# Patient Record
Sex: MALE | Race: WHITE | NOT HISPANIC OR LATINO | Employment: UNEMPLOYED | URBAN - METROPOLITAN AREA
[De-identification: names, ages, dates, MRNs, and addresses within clinical notes are randomized per-mention and may not be internally consistent; named-entity substitution may affect disease eponyms.]

---

## 2021-02-11 ENCOUNTER — HISTORICAL (OUTPATIENT)
Dept: RADIOLOGY | Facility: HOSPITAL | Age: 46
End: 2021-02-11

## 2021-02-24 ENCOUNTER — HISTORICAL (OUTPATIENT)
Dept: SURGERY | Facility: HOSPITAL | Age: 46
End: 2021-02-24

## 2021-03-24 ENCOUNTER — HISTORICAL (OUTPATIENT)
Dept: SURGERY | Facility: HOSPITAL | Age: 46
End: 2021-03-24

## 2021-04-14 ENCOUNTER — HISTORICAL (OUTPATIENT)
Dept: SURGERY | Facility: HOSPITAL | Age: 46
End: 2021-04-14

## 2021-10-04 ENCOUNTER — HISTORICAL (OUTPATIENT)
Dept: SURGERY | Facility: HOSPITAL | Age: 46
End: 2021-10-04

## 2022-02-15 ENCOUNTER — HISTORICAL (OUTPATIENT)
Dept: LAB | Facility: HOSPITAL | Age: 47
End: 2022-02-15

## 2022-02-15 LAB — SARS-COV-2 AG RESP QL IA.RAPID: NOT DETECTED

## 2022-02-16 ENCOUNTER — HISTORICAL (OUTPATIENT)
Dept: SURGERY | Facility: HOSPITAL | Age: 47
End: 2022-02-16

## 2022-04-11 ENCOUNTER — HISTORICAL (OUTPATIENT)
Dept: ADMINISTRATIVE | Facility: HOSPITAL | Age: 47
End: 2022-04-11
Payer: OTHER GOVERNMENT

## 2022-04-27 VITALS
SYSTOLIC BLOOD PRESSURE: 139 MMHG | DIASTOLIC BLOOD PRESSURE: 95 MMHG | WEIGHT: 210 LBS | BODY MASS INDEX: 28.44 KG/M2 | HEIGHT: 72 IN

## 2022-05-04 NOTE — HISTORICAL [facility]
This is a historical note converted from Stephanie. Formatting and pictures may have been removed.  Please reference Stephanie for original formatting and attached multimedia. Procedure Name  Left L3 and L4 Transforaminal Epidural Steroid Injections  ?   Pre-Procedure Diagnoses:  1. Chronic pain syndrome  2. Low back pain  3. Lumbar radiculopathy  4. Lumbar disc displacement  5. Lumbar degenerative disc disease  ?   Post-Procedure Diagnoses:  1. Chronic pain syndrome  2. Low back pain  3. Lumbar radiculopathy  4. Lumbar disc displacement  5. Lumbar degenerative disc disease  ?   Anesthesia:  Local and MAC  ?  Estimated Blood Loss:  None  ?  Complications:  None  ?  Informed Consent:  The procedure, risks, benefits, and alternatives were discussed with the patient.? There were no contraindications to the procedure.? The patient expressed understanding and agreed to proceed.? Fully informed written consent was obtained.?  ?  Description of the Procedure:  The patient was taken to the operating room.? IV access was obtained prior to the start of the procedure.? The patient was positioned prone on the fluoroscopy table.? Continuous hemodynamic monitoring was initiated and continued throughout the duration of the procedure.? The skin overlying the lumbosacral spine was prepped with Chloraprep and draped into a sterile field.? An oblique fluoroscopic view was obtained on the?left side at?L4, with the superior articular process of the inferior vertebral body aligned with the pedicle.? Skin anesthesia was achieved using 1 mL of 1% lidocaine.? A 22-gauge?3.5 inch Quinke spinal needle was slowly inserted and advanced towards the 6 oclock position of the pedicle and into the epidural space.? Proper needle position was confirmed under AP, oblique, and lateral fluoroscopic views.? Negative aspiration for blood or CSF was confirmed.? 0.5 mL of Isovue contrast was injected.? Fluoroscopic imaging revealed a clear outline of the spinal  nerve with proximal spread of agent through the neural foramen and into the epidural space.? Then a combination of 5 mg of dexamethasone and 1 mL of 0.5% bupivacaine was easily injected.? There was no pain on injection.? The needle was removed intact and bleeding was nil.? The same procedure was repeated in identical fashion on the?left side at L3. Sterile bandages were applied.? The patient was taken to the recovery room for further observation in stable condition.? The patient was then discharged home without any complications.

## 2022-05-04 NOTE — HISTORICAL [facility]
This is a historical note converted from Stephanie. Formatting and pictures may have been removed.  Please reference Stephanie for original formatting and attached multimedia. Procedure Name  1. Left Sacroiliac Joint Injection  2. Left Piriformis Injection  ?   Pre-Procedure Diagnoses:  1. Chronic pain syndrome  2. Left SI joint pain  3. Left sacroiliitis  4. Low back pain  5. Left piriformis syndrome  ?   Post-Procedure Diagnoses:  1. Chronic pain syndrome  2. Left SI joint pain  3. Left sacroiliitis  4. Low back pain  5. Left piriformis syndrome  ?   Anesthesia:  Local and MAC  ?  Estimated Blood Loss:  None  ?  Complications:  None  ?  Informed Consent:  The procedure, risks, benefits, and alternatives were discussed with the patient.? There were no contraindications to the procedure.? The patient expressed understanding and agreed to proceed.? Fully informed written consent was obtained.?  ?  Description of the Procedure:  The patient was taken to the operating room.? IV access was obtained prior to the start of the procedure.? The patient was positioned prone on the fluoroscopy table.? Continuous hemodynamic monitoring was initiated and continued throughout the duration of the procedure.? The skin overlying the lumbosacral spine was prepped with Chloraprep and draped into a sterile field.? Fluoroscopy was used to identify the location of the left SI joint.? Skin anesthesia was achieved using?1?mL of 1% lidocaine over the injection site.? A 22 gauge 3.5 inch Quinke spinal needle was slowly inserted and advanced?under intermittent fluoroscopy?through the SI joint capsule.? Proper needle position was confirmed under AP, oblique, and lateral fluoroscopic views.? Negative aspiration was confirmed.? Then a combination of?20 mg of Kenalog and?1 mL of?0.25% bupivacaine?was easily injected.? There was no pain on injection.? The needle was removed intact and bleeding was nil.?  Attention was then turned to the left  piriformis.??Fluoroscopy was used to identify the location of the left SI joint.? Skin anesthesia was achieved using?1?mL of 1% lidocaine over the injection site.? A 22 gauge 3.5 inch Quinke spinal needle was slowly inserted and advanced?under intermittent fluoroscopy until it made bony contact at the inferior aspect of the SI joint.? It was then walked 1 cm lateral and 1 cm inferior into the piriformis muscle.? Proper needle position was confirmed under AP, oblique, and lateral fluoroscopic views.? Negative aspiration was confirmed.? 1 mL of Isovue contrast was injected which revealed diagonal spread. Then a combination of?20 mg of Kenalog and?3 mL of?0.25% bupivacaine?was easily injected.? There was no pain on injection.? The needle was removed intact and bleeding was nil.? Sterile bandages were applied.? The patient was taken to the recovery room for further observation in stable condition.? The patient was then discharged home without any complications.

## 2022-05-04 NOTE — HISTORICAL [facility]
This is a historical note converted from Stephanie. Formatting and pictures may have been removed.  Please reference Stephanie for original formatting and attached multimedia. Procedure Name  Left Piriformis Injection  ?   Pre-Procedure Diagnoses:  1. Chronic pain syndrome  2. Left piriformis syndrome  3.?Left buttock pain  4. Low back pain  ?   1. Chronic pain syndrome  2. Left piriformis syndrome  3. Left buttock pain  4. Low back pain  ?   Anesthesia:  Local and MAC  ?  Estimated Blood Loss:  None  ?  Complications:  None  ?  Informed Consent:  The procedure, risks, benefits, and alternatives were discussed with the patient.? There were no contraindications to the procedure.? The patient expressed understanding and agreed to proceed.? Fully informed written consent was obtained.?  ?  Description of the Procedure:  The patient was taken to the operating room.? IV access was obtained prior to the start of the procedure.? The patient was positioned prone on the fluoroscopy table.? Continuous hemodynamic monitoring was initiated and continued throughout the duration of the procedure.? The skin overlying the lumbosacral spine was prepped with Choraprep and draped into a sterile field.? Fluoroscopy was used to identify the location of the left SI joint.? Skin anesthesia was achieved using?1?mL of 1% lidocaine over the injection site.? A 22 gauge 3.5 inch Quinke spinal needle was slowly inserted and advanced?under intermittent fluoroscopy until it made bony contact at the inferior aspect of the SI joint.? It was then walked 1 cm lateral and 1 cm inferior into the piriformis muscle.? Proper needle position was confirmed under AP, oblique, and lateral fluoroscopic views.? Negative aspiration was confirmed.? 1 mL of Isovue contrast was injected which revealed diagonal spread. Then a combination of?40 mg of Kenalog and?2 mL of?0.5% bupivacaine?was easily injected.? There was no pain on injection.? The needle was removed intact  and bleeding was nil.? The same procedure was repeated in identical fashion on the right side.? Sterile bandages were applied.? The patient was taken to the recovery room for further observation in stable condition.? The patient was then discharged home without any complications.

## 2022-05-04 NOTE — HISTORICAL [facility]
This is a historical note converted from Stephanie. Formatting and pictures may have been removed.  Please reference Stephanie for original formatting and attached multimedia. Procedure Name  Bilateral Sacroiliac Joint Injections  ?   Pre-Procedure Diagnoses:  1. Chronic pain syndrome  2. Bilateral SI joint pain  3. Bilateral sacroiliitis  4. Low back pain  ?   Post-Procedure Diagnoses:  1. Chronic pain syndrome  2. Bilateral SI joint pain  3. Bilateral sacroiliitis  4. Low back pain  ?   Anesthesia:  Local and MAC  ?  Estimated Blood Loss:  None  ?  Complications:  None  ?  Informed Consent:  The procedure, risks, benefits, and alternatives were discussed with the patient.? There were no contraindications to the procedure.? The patient expressed understanding and agreed to proceed.? Fully informed written consent was obtained.?  ?  Description of the Procedure:  The patient was taken to the operating room.? IV access was obtained prior to the start of the procedure.? The patient was positioned prone on the fluoroscopy table.? Continuous hemodynamic monitoring was initiated and continued throughout the duration of the procedure.? The skin overlying the lumbosacral spine was prepped with Chloraprep and draped into a sterile field.? Fluoroscopy was used to identify the location of the left SI joint.? Skin anesthesia was achieved using?1?mL of 1% lidocaine over the injection site.? A 22 gauge 3.5 inch Quinke spinal needle was slowly inserted and advanced?under intermittent fluoroscopy?through the SI joint capsule.? Proper needle position was confirmed under AP, oblique, and lateral fluoroscopic views.? Negative aspiration was confirmed.? Then a combination of?20 mg of Kenalog and?1 mL of?0.5% bupivacaine?was easily injected.? There was no pain on injection.? The needle was removed intact and bleeding was nil.? The same procedure was repeated in identical fashion on the right side.? Sterile bandages were applied.? The patient  was taken to the recovery room for further observation in stable condition.? The patient was then discharged home without any complications.

## 2022-05-21 NOTE — HISTORICAL [facility]
This is a historical note converted from Stephanie. Formatting and pictures may have been removed.  Please reference Stephanie for original formatting and attached multimedia. Procedure Name  1. Left Sacroiliac Joint Injection  2. Left Piriformis Injection  ?   Pre-Procedure Diagnoses:  1. Chronic pain syndrome  2. Left SI joint pain  3. Left sacroiliitis  4. Left piriformis syndrome  5. Left buttock pain  ?   Post-Procedure Diagnoses:  1. Chronic pain syndrome  2. Left SI joint pain  3. Left sacroiliitis  4. Left piriformis syndrome  5. Left buttock pain  ?   Anesthesia:  Local and MAC  ?  Estimated Blood Loss:  None  ?  Complications:  None  ?  Informed Consent:  The procedure, risks, benefits, and alternatives were discussed with the patient.? There were no contraindications to the procedure.? The patient expressed understanding and agreed to proceed.? Fully informed written consent was obtained.?  ?  Description of the Procedure:  The patient was taken to the operating room.? IV access was obtained prior to the start of the procedure.? The patient was positioned prone on the fluoroscopy table.? Continuous hemodynamic monitoring was initiated and continued throughout the duration of the procedure.? The skin overlying the lumbosacral spine was prepped with Chloraprep and draped into a sterile field.? Fluoroscopy was used to identify the location of the left SI joint.? Skin anesthesia was achieved using?1?mL of 1% lidocaine over the injection site.? A 22 gauge 3.5 inch Quinke spinal needle was slowly inserted and advanced?under intermittent fluoroscopy?through the SI joint capsule.? Proper needle position was confirmed under AP, oblique, and lateral fluoroscopic views.? Negative aspiration was confirmed.? Then a combination of?20 mg of Kenalog and?1 mL of?0.5% bupivacaine?was easily injected.? There was no pain on injection.? The needle was removed intact and bleeding was nil.?  Attention was then turned to the left  piriformis.? Fluoroscopy was used to identify the location of the left SI joint.? Skin anesthesia was achieved using?1?mL of 1% lidocaine over the injection site.? A 22 gauge 3.5 inch Quinke spinal needle was slowly inserted and advanced?under intermittent fluoroscopy until it made bony contact at the inferior aspect of the SI joint.? It was then walked 1 cm lateral and 1 cm inferior into the piriformis muscle.? Proper needle position was confirmed under AP, oblique, and lateral fluoroscopic views.? Negative aspiration was confirmed.? 1 mL of Isovue contrast was injected which revealed diagonal spread. Then a combination of?20 mg of Kenalog and?3 mL of?0.25% bupivacaine?was easily injected.? There was no pain on injection.? The needle was removed intact and bleeding was nil.? Sterile bandages were applied.? The patient was taken to the recovery room for further observation in stable condition.? The patient was then discharged home without any complications.

## 2022-07-26 ENCOUNTER — OFFICE VISIT (OUTPATIENT)
Dept: ORTHOPEDICS | Facility: CLINIC | Age: 47
End: 2022-07-26
Payer: OTHER GOVERNMENT

## 2022-07-26 VITALS
DIASTOLIC BLOOD PRESSURE: 104 MMHG | HEART RATE: 93 BPM | SYSTOLIC BLOOD PRESSURE: 147 MMHG | BODY MASS INDEX: 29.26 KG/M2 | WEIGHT: 216.06 LBS | HEIGHT: 72 IN

## 2022-07-26 DIAGNOSIS — G89.29 CHRONIC BACK PAIN GREATER THAN 3 MONTHS DURATION: Primary | ICD-10-CM

## 2022-07-26 DIAGNOSIS — M46.1 SACROILIITIS: ICD-10-CM

## 2022-07-26 DIAGNOSIS — M54.9 CHRONIC BACK PAIN GREATER THAN 3 MONTHS DURATION: Primary | ICD-10-CM

## 2022-07-26 DIAGNOSIS — M51.36 LUMBAR DEGENERATIVE DISC DISEASE: ICD-10-CM

## 2022-07-26 PROCEDURE — 99213 OFFICE O/P EST LOW 20 MIN: CPT | Mod: 25,,, | Performed by: ANESTHESIOLOGY

## 2022-07-26 PROCEDURE — 96372 PR INJECTION,THERAP/PROPH/DIAG2ST, IM OR SUBCUT: ICD-10-PCS | Mod: ,,, | Performed by: ANESTHESIOLOGY

## 2022-07-26 PROCEDURE — 96372 THER/PROPH/DIAG INJ SC/IM: CPT | Mod: ,,, | Performed by: ANESTHESIOLOGY

## 2022-07-26 PROCEDURE — 99213 PR OFFICE/OUTPT VISIT, EST, LEVL III, 20-29 MIN: ICD-10-PCS | Mod: 25,,, | Performed by: ANESTHESIOLOGY

## 2022-07-26 RX ORDER — RAMELTEON 8 MG/1
8 TABLET ORAL NIGHTLY
COMMUNITY

## 2022-07-26 RX ORDER — MELOXICAM 7.5 MG/1
7.5 TABLET ORAL DAILY
COMMUNITY

## 2022-07-26 RX ORDER — PRAVASTATIN SODIUM 20 MG/1
20 TABLET ORAL NIGHTLY
COMMUNITY

## 2022-07-26 RX ORDER — VENLAFAXINE 75 MG/1
75 TABLET ORAL DAILY
COMMUNITY

## 2022-07-26 RX ORDER — BETAMETHASONE SODIUM PHOSPHATE AND BETAMETHASONE ACETATE 3; 3 MG/ML; MG/ML
12 INJECTION, SUSPENSION INTRA-ARTICULAR; INTRALESIONAL; INTRAMUSCULAR; SOFT TISSUE
Status: COMPLETED | OUTPATIENT
Start: 2022-07-26 | End: 2022-07-26

## 2022-07-26 RX ORDER — MIRTAZAPINE 45 MG/1
45 TABLET, FILM COATED ORAL NIGHTLY
COMMUNITY

## 2022-07-26 RX ORDER — LIDOCAINE 50 MG/G
1 PATCH TOPICAL
COMMUNITY

## 2022-07-26 RX ORDER — PRAZOSIN HYDROCHLORIDE 2 MG/1
4 CAPSULE ORAL NIGHTLY
COMMUNITY

## 2022-07-26 RX ORDER — SENNOSIDES 8.6 MG/1
17.2 TABLET ORAL DAILY
COMMUNITY

## 2022-07-26 RX ORDER — TESTOSTERONE ENANTHATE 200 MG/ML
200 VIAL (ML) INTRAMUSCULAR
COMMUNITY

## 2022-07-26 RX ORDER — ADALIMUMAB 40MG/0.4ML
40 KIT SUBCUTANEOUS
COMMUNITY

## 2022-07-26 RX ADMIN — BETAMETHASONE SODIUM PHOSPHATE AND BETAMETHASONE ACETATE 12 MG: 3; 3 INJECTION, SUSPENSION INTRA-ARTICULAR; INTRALESIONAL; INTRAMUSCULAR; SOFT TISSUE at 03:07

## 2022-07-26 NOTE — PROGRESS NOTES
Beth Brooks MD        PATIENT NAME: Marlon Franco  : 1975  DATE: 22  MRN: [id number]      Billing Provider: Beth Brooks MD  Level of Service:   Patient PCP Information     Provider PCP Type    Primary Doctor No General          Reason for Visit / Chief Complaint: Follow-up (4 month f/u back pain, last seen 22; Left SI Joint & Left Piriformis Inj, 22. Pt states he did not do physical therapy. Pt states he had a fall about a month ago while fishing, broke two bones in back, has disc no report. Pain level 5 out of 10. )       Update PCP  Update Chief Complaint         History of Present Illness / Problem Focused Workflow     Marlon Franco presents to the clinic with Follow-up (4 month f/u back pain, last seen 22; Left SI Joint & Left Piriformis Inj, 22. Pt states he did not do physical therapy. Pt states he had a fall about a month ago while fishing, broke two bones in back, has disc no report. Pain level 5 out of 10. )     This is a 47-year-old male who returns to clinic today for follow-up of his chronic low back pain.  He was last seen here for follow-up back in March and had undergone left sacroiliac joint injection and left piriformis injection in February of this year.  He was doing pretty well after those injections until about a month ago when he fell while fishing in a boat.  He states that he has 2 fractures in his back.  He is scheduled to follow-up with a neurologist next week he currently rates his pain as 5/10 on the NRS.    Follow-up        Review of Systems     Review of Systems   Musculoskeletal: Positive for back pain.   All other systems reviewed and are negative.       Medical / Social / Family History     Past Medical History:   Diagnosis Date    Carpal tunnel syndrome of left wrist     Carpal tunnel syndrome of right wrist     Essential tremor     GERD (gastroesophageal reflux disease)     HTN (hypertension)     Low back pain      Lumbar radiculitis     Lumbar spinal stenosis     Piriformis syndrome     PTSD (post-traumatic stress disorder)     Sacroiliitis     Sleep apnea, unspecified        Past Surgical History:   Procedure Laterality Date    ANKLE SURGERY Left     BUNIONECTOMY Left     INJECTION OF PIRIFORMIS MUSCLE Left 02/16/2022    INJECTION, SACROILIAC JOINT Left 02/16/2022    ORIF LEFT ARM      REPAIR OF SCROTUM      SURGICAL REMOVAL OF PILONIDAL CYST      TRANSFORAMINAL EPIDURAL INJECTION OF STEROID Left 02/24/2021    L3 & L4    TRIGGER POINT INJECTION  10/04/2021       Social History  Mr. Franco  reports that he has never smoked. He has never used smokeless tobacco. He reports previous alcohol use. He reports that he does not use drugs.    Family History  's [place] family history includes Cancer in his mother; Heart disease in his father.    Medications and Allergies     Medications  Outpatient Medications Marked as Taking for the 7/26/22 encounter (Office Visit) with Beth Brooks MD   Medication Sig Dispense Refill    adalimumab (HUMIRA,CF,) 40 mg/0.4 mL SyKt Inject 40 mg into the skin every 14 (fourteen) days.      allopurinoL (ZYLOPRIM) 100 MG tablet Take 100 mg by mouth once daily.      busPIRone (BUSPAR) 15 MG tablet Take 15 mg by mouth 3 (three) times daily.      cyclobenzaprine (FLEXERIL) 10 MG tablet Take 10 mg by mouth 3 (three) times daily as needed.      dicyclomine (BENTYL) 20 mg tablet Take 20 mg by mouth every 6 (six) hours.      divalproex (DEPAKOTE) 500 MG TbEC Take 250 mg by mouth every 8 (eight) hours.      fluticasone propionate (FLONASE) 50 mcg/actuation nasal spray 1 spray by Each Nostril route once daily.      gabapentin (NEURONTIN) 300 MG capsule Take 300 mg by mouth 3 (three) times daily.      HYDROcodone-acetaminophen (NORCO)  mg per tablet Take 1 tablet by mouth.      hydrOXYzine (ATARAX) 50 MG tablet Take 50 mg by mouth 4 (four) times daily.      LIDOcaine (LIDODERM) 5  % Place 1 patch onto the skin every 24 hours. Remove & Discard patch within 12 hours or as directed by MD      meloxicam (MOBIC) 7.5 MG tablet Take 7.5 mg by mouth once daily.      midodrine (PROAMATINE) 5 MG Tab Take 5 mg by mouth.      mirtazapine (REMERON) 30 MG tablet Take 30 mg by mouth every evening.      pantoprazole (PROTONIX) 40 MG tablet Take 40 mg by mouth.      pravastatin (PRAVACHOL) 20 MG tablet Take 20 mg by mouth once daily.      prazosin (MINIPRESS) 2 MG Cap Take by mouth 2 (two) times daily.      primidone (MYSOLINE) 50 MG Tab Take by mouth.      ramelteon (ROZEREM) 8 mg tablet Take 8 mg by mouth every evening.      senna (SENOKOT) 8.6 mg tablet Take 17.2 mg by mouth once daily.      testosterone enanthate (DELATESTRYL) 200 mg/mL injection Inject 200 mg into the muscle every 14 (fourteen) days.      traZODone (DESYREL) 100 MG tablet Take 100 mg by mouth.      venlafaxine (EFFEXOR) 75 MG tablet Take 75 mg by mouth 2 (two) times daily.      verapamiL (CALAN) 120 MG tablet Take 120 mg by mouth 3 (three) times daily.         Allergies  Review of patient's allergies indicates:  No Known Allergies    Physical Examination     Vitals:    07/26/22 1315   BP: (!) 147/104   Pulse: 93     Spine Musculoskeletal Exam    General        Constitutional: appears stated age, well-developed and well-nourished    Scleral icterus: no    Labored breathing: no    Psychiatric: normal mood and affect and no acute distress    Neurological: alert and oriented x3    Skin: intact       Assessment and Plan (including Health Maintenance)      Problem List  Smart Sets  Document Outside HM   :    Plan:   Chronic back pain greater than 3 months duration    Sacroiliitis    Lumbar degenerative disc disease       He will receive a Celestone intramuscular injection in the office today for a flare-up of back pain since he fell at the beginning of this month.  I will plan to follow up with him again in 2 months.    Problem  List Items Addressed This Visit        Orthopedic Problems    Sacroiliitis    Lumbar degenerative disc disease       Other    Chronic back pain greater than 3 months duration - Primary            No future appointments.     There are no Patient Instructions on file for this visit.  No follow-ups on file.     Signature:  Beth Brooks MD      Date of encounter: 7/26/22

## 2022-09-26 ENCOUNTER — OFFICE VISIT (OUTPATIENT)
Dept: ORTHOPEDICS | Facility: CLINIC | Age: 47
End: 2022-09-26
Payer: OTHER GOVERNMENT

## 2022-09-26 VITALS
DIASTOLIC BLOOD PRESSURE: 96 MMHG | HEIGHT: 71 IN | SYSTOLIC BLOOD PRESSURE: 135 MMHG | BODY MASS INDEX: 29.12 KG/M2 | HEART RATE: 89 BPM | WEIGHT: 208 LBS

## 2022-09-26 DIAGNOSIS — M54.2 CERVICALGIA: ICD-10-CM

## 2022-09-26 DIAGNOSIS — M54.12 CERVICAL RADICULITIS: ICD-10-CM

## 2022-09-26 DIAGNOSIS — M51.36 LUMBAR DEGENERATIVE DISC DISEASE: ICD-10-CM

## 2022-09-26 DIAGNOSIS — M50.30 DDD (DEGENERATIVE DISC DISEASE), CERVICAL: ICD-10-CM

## 2022-09-26 DIAGNOSIS — M54.9 CHRONIC BACK PAIN GREATER THAN 3 MONTHS DURATION: Primary | ICD-10-CM

## 2022-09-26 DIAGNOSIS — G89.29 CHRONIC BACK PAIN GREATER THAN 3 MONTHS DURATION: Primary | ICD-10-CM

## 2022-09-26 PROCEDURE — 99214 OFFICE O/P EST MOD 30 MIN: CPT | Mod: ,,, | Performed by: ANESTHESIOLOGY

## 2022-09-26 PROCEDURE — 99214 PR OFFICE/OUTPT VISIT, EST, LEVL IV, 30-39 MIN: ICD-10-PCS | Mod: ,,, | Performed by: ANESTHESIOLOGY

## 2022-09-26 RX ORDER — CLONAZEPAM 1 MG/1
1 TABLET ORAL 3 TIMES DAILY PRN
COMMUNITY

## 2022-09-26 RX ORDER — TESTOSTERONE CYPIONATE 200 MG/ML
200 INJECTION, SOLUTION INTRAMUSCULAR
COMMUNITY
Start: 2022-08-30

## 2022-09-26 RX ORDER — TOPIRAMATE 200 MG/1
200 TABLET ORAL
COMMUNITY

## 2022-09-26 RX ORDER — LANOLIN ALCOHOL/MO/W.PET/CERES
1 CREAM (GRAM) TOPICAL DAILY
COMMUNITY
Start: 2022-08-26

## 2022-09-26 RX ORDER — FLECAINIDE ACETATE 50 MG/1
50 TABLET ORAL 2 TIMES DAILY
COMMUNITY

## 2022-09-26 RX ORDER — NALOXONE HYDROCHLORIDE 4 MG/.1ML
SPRAY NASAL
COMMUNITY
Start: 2022-06-10

## 2022-09-26 RX ORDER — CARBAMAZEPINE 200 MG/1
200 TABLET ORAL 2 TIMES DAILY
COMMUNITY
End: 2024-03-11

## 2022-09-26 RX ORDER — CITALOPRAM 40 MG/1
40 TABLET, FILM COATED ORAL DAILY
COMMUNITY
End: 2024-03-11

## 2022-09-26 RX ORDER — TAMSULOSIN HYDROCHLORIDE 0.4 MG/1
0.4 CAPSULE ORAL DAILY
COMMUNITY
Start: 2022-08-12

## 2022-09-26 NOTE — H&P (VIEW-ONLY)
Beth Brooks MD        PATIENT NAME: Marlon Franco  : 1975  DATE: 22  MRN: [id number]      Billing Provider: Beth Brooks MD  Level of Service:   Patient PCP Information       Provider PCP Type    Primary Doctor No General            Reason for Visit / Chief Complaint: Back Pain (States having constant pain in his back, reports intensity gets worse depending on what he does. States pain will radiate down left hip and into his leg. Occasional difficulty walking due to pain. Pain scale 5-6/10 right now.)       Update PCP  Update Chief Complaint         History of Present Illness / Problem Focused Workflow     Marlon Franco presents to the clinic with Back Pain (States having constant pain in his back, reports intensity gets worse depending on what he does. States pain will radiate down left hip and into his leg. Occasional difficulty walking due to pain. Pain scale 5-6/10 right now.)     Here for f/u of LBP and also c/o neck pain radiating down the left arm to his elbow. Last seen in March and referred to PT, but [facility].  He is apparently being referred to a neurologist or neurosurgeon in [place].  He reports dizzy spells that occasionally cause him to fall. He has been dieting to lose some weight.    Back Pain      Review of Systems     Review of Systems   Musculoskeletal:  Positive for back pain, neck pain and neck stiffness.   Neurological:  Positive for dizziness.   All other systems reviewed and are negative.     Medical / Social / Family History     Past Medical History:   Diagnosis Date    Carpal tunnel syndrome of left wrist     Carpal tunnel syndrome of right wrist     Essential tremor     GERD (gastroesophageal reflux disease)     HTN (hypertension)     Low back pain     Lumbar radiculitis     Lumbar spinal stenosis     Piriformis syndrome     PTSD (post-traumatic stress disorder)     Sacroiliitis     Sleep apnea,  unspecified        Past Surgical History:   Procedure Laterality Date    ANKLE SURGERY Left     BUNIONECTOMY Left     INJECTION OF PIRIFORMIS MUSCLE Left 02/16/2022    INJECTION, SACROILIAC JOINT Left 02/16/2022    ORIF LEFT ARM      REPAIR OF SCROTUM      SURGICAL REMOVAL OF PILONIDAL CYST      TRANSFORAMINAL EPIDURAL INJECTION OF STEROID Left 02/24/2021    L3 & L4    TRIGGER POINT INJECTION  10/04/2021       Social History  Mr. Franco  reports that he has never smoked. He has never used smokeless tobacco. He reports that he does not currently use alcohol. He reports that he does not use drugs.    Family History  Mr.'s Franco family history includes Cancer in his mother; Heart disease in his father.    Medications and Allergies     Medications  Outpatient Medications Marked as Taking for the 9/26/22 encounter (Office Visit) with Beth Brooks MD   Medication Sig Dispense Refill    adalimumab (HUMIRA,CF,) 40 mg/0.4 mL SyKt Inject 40 mg into the skin every 14 (fourteen) days.      allopurinoL (ZYLOPRIM) 100 MG tablet Take 100 mg by mouth once daily.      busPIRone (BUSPAR) 15 MG tablet Take 15 mg by mouth 3 (three) times daily.      carBAMazepine (TEGRETOL) 200 mg tablet Take 200 mg by mouth 2 (two) times daily.      citalopram (CELEXA) 40 MG tablet Take 40 mg by mouth.      clonazePAM (KLONOPIN) 1 MG tablet Take 1 mg by mouth 3 (three) times daily as needed.      cyanocobalamin (VITAMIN B-12) 1000 MCG tablet Take 1 tablet by mouth once daily.      cyclobenzaprine (FLEXERIL) 10 MG tablet Take 10 mg by mouth 3 (three) times daily as needed.      dicyclomine (BENTYL) 20 mg tablet Take 20 mg by mouth every 6 (six) hours.      divalproex (DEPAKOTE) 500 MG TbEC Take 250 mg by mouth every 8 (eight) hours.      flecainide (TAMBOCOR) 50 MG Tab Take 50 mg by mouth 2 (two) times daily.      fluticasone propionate (FLONASE) 50 mcg/actuation nasal spray 1 spray by Each Nostril route once daily.      gabapentin (NEURONTIN) 300  MG capsule Take 300 mg by mouth 3 (three) times daily.      HYDROcodone-acetaminophen (NORCO)  mg per tablet Take 1 tablet by mouth.      hydrOXYzine (ATARAX) 50 MG tablet Take 50 mg by mouth 4 (four) times daily.      LIDOcaine (LIDODERM) 5 % Place 1 patch onto the skin every 24 hours. Remove & Discard patch within 12 hours or as directed by MD      meloxicam (MOBIC) 7.5 MG tablet Take 7.5 mg by mouth once daily.      midodrine (PROAMATINE) 5 MG Tab Take 5 mg by mouth.      mirtazapine (REMERON) 30 MG tablet Take 30 mg by mouth every evening.      naloxone (NARCAN) 4 mg/actuation Spry USE 1 SPRAY IN ONE NOSTRIL ONCE AS DIRECTED FOR OPIATE REVERSAL  FOR OPIOID OVERDOSE. DO NOT PRIME NASAL SPRAY. GIVE ADDITIONAL DOSE IF  PATIENT DOES NOT RESPOND WITHIN 2-3 MINUTES OR RESPONDS BUT STOPS  BREATHING AGAIN.  CALL 911.  IF USED, NOTIFY YOUR PROVIDER FOR OPIATE REVERSAL  FOR OPIOID OVERDOSE. DO NOT PRIME NASAL SPRAY. GIVE ADDITIONAL DOSE IF  PATIENT DOES NOT RESPOND WITHIN 2-3 MINUTES OR RESPONDS BUT STOPS  BREATHING AGAIN.  CALL 911.  IF USED, NOTIFY YOUR PROVIDER      pantoprazole (PROTONIX) 40 MG tablet Take 40 mg by mouth.      pravastatin (PRAVACHOL) 20 MG tablet Take 20 mg by mouth once daily.      prazosin (MINIPRESS) 2 MG Cap Take by mouth 2 (two) times daily.      primidone (MYSOLINE) 50 MG Tab Take by mouth.      ramelteon (ROZEREM) 8 mg tablet Take 8 mg by mouth every evening.      senna (SENOKOT) 8.6 mg tablet Take 17.2 mg by mouth once daily.      tamsulosin (FLOMAX) 0.4 mg Cap 0.4 mg.      testosterone cypionate (DEPOTESTOTERONE CYPIONATE) 200 mg/mL injection INJECT 200MG/1ML INTRAMUSCULARLY EVERY 2 WEEKS      testosterone enanthate (DELATESTRYL) 200 mg/mL injection Inject 200 mg into the muscle every 14 (fourteen) days.      topiramate (TOPAMAX) 200 MG Tab Take 200 mg by mouth.      traZODone (DESYREL) 100 MG tablet Take 100 mg by mouth.      venlafaxine (EFFEXOR) 75 MG tablet Take 75 mg by mouth 2  (two) times daily.      verapamiL (CALAN) 120 MG tablet Take 120 mg by mouth 3 (three) times daily.         Allergies  Review of patient's allergies indicates:  No Known Allergies    Physical Examination     Vitals:    09/26/22 1247   BP: (!) 135/96   Pulse: 89     Spine Musculoskeletal Exam    Gait    Gait is normal.    Inspection    Cervical Spine    Cervical spine inspection is normal.    Range of Motion    Cervical Spine        Cervical spine range of motion additional comments: Restricted ROM in neck due to pain.    Strength    Cervical Spine    Cervical spine motor exam is normal.    Sensory    Cervical Spine    Cervical spine sensation is normal.    General      Constitutional: appears stated age, well-developed and well-nourished    Scleral icterus: no    Labored breathing: no    Psychiatric: normal mood and affect and no acute distress    Neurological: alert and oriented x3    Skin: intact    Lymphadenopathy: none     Assessment and Plan (including Health Maintenance)      Problem List  Smart Sets  Document Outside HM   :    Plan:   Chronic back pain greater than 3 months duration    Lumbar degenerative disc disease    Cervicalgia    Cervical radiculitis    DDD (degenerative disc disease), cervical     Schedule ATUL (C7-T1)/    Problem List Items Addressed This Visit          Orthopedic Problems    Lumbar degenerative disc disease    DDD (degenerative disc disease), cervical       Other    Chronic back pain greater than 3 months duration - Primary    Cervicalgia    Cervical radiculitis         Future Appointments   Date Time Provider Department Center   10/27/2022 11:30 AM [facility] [place]        There are no Patient Instructions on file for this visit.  No follow-ups on file.     Signature:  Beth Brooks MD      Date of encounter: 9/26/22

## 2022-09-26 NOTE — PROGRESS NOTES
Beth Brooks MD        PATIENT NAME: Marlon Franco  : 1975  DATE: 22  MRN: [id number]      Billing Provider: Beth Brooks MD  Level of Service:   Patient PCP Information       Provider PCP Type    Primary Doctor No General            Reason for Visit / Chief Complaint: Back Pain (States having constant pain in his back, reports intensity gets worse depending on what he does. States pain will radiate down left hip and into his leg. Occasional difficulty walking due to pain. Pain scale 5-6/10 right now.)       Update PCP  Update Chief Complaint         History of Present Illness / Problem Focused Workflow     Marlon Franco presents to the clinic with Back Pain (States having constant pain in his back, reports intensity gets worse depending on what he does. States pain will radiate down left hip and into his leg. Occasional difficulty walking due to pain. Pain scale 5-6/10 right now.)     Here for f/u of LBP and also c/o neck pain radiating down the left arm to his elbow. Last seen in March and referred to PT, but [facility].  He is apparently being referred to a neurologist or neurosurgeon in [place].  He reports dizzy spells that occasionally cause him to fall. He has been dieting to lose some weight.    Back Pain      Review of Systems     Review of Systems   Musculoskeletal:  Positive for back pain, neck pain and neck stiffness.   Neurological:  Positive for dizziness.   All other systems reviewed and are negative.     Medical / Social / Family History     Past Medical History:   Diagnosis Date    Carpal tunnel syndrome of left wrist     Carpal tunnel syndrome of right wrist     Essential tremor     GERD (gastroesophageal reflux disease)     HTN (hypertension)     Low back pain     Lumbar radiculitis     Lumbar spinal stenosis     Piriformis syndrome     PTSD (post-traumatic stress disorder)     Sacroiliitis     Sleep apnea,  unspecified        Past Surgical History:   Procedure Laterality Date    ANKLE SURGERY Left     BUNIONECTOMY Left     INJECTION OF PIRIFORMIS MUSCLE Left 02/16/2022    INJECTION, SACROILIAC JOINT Left 02/16/2022    ORIF LEFT ARM      REPAIR OF SCROTUM      SURGICAL REMOVAL OF PILONIDAL CYST      TRANSFORAMINAL EPIDURAL INJECTION OF STEROID Left 02/24/2021    L3 & L4    TRIGGER POINT INJECTION  10/04/2021       Social History  Mr. Franco  reports that he has never smoked. He has never used smokeless tobacco. He reports that he does not currently use alcohol. He reports that he does not use drugs.    Family History  Mr.'s Franco family history includes Cancer in his mother; Heart disease in his father.    Medications and Allergies     Medications  Outpatient Medications Marked as Taking for the 9/26/22 encounter (Office Visit) with Beth Brooks MD   Medication Sig Dispense Refill    adalimumab (HUMIRA,CF,) 40 mg/0.4 mL SyKt Inject 40 mg into the skin every 14 (fourteen) days.      allopurinoL (ZYLOPRIM) 100 MG tablet Take 100 mg by mouth once daily.      busPIRone (BUSPAR) 15 MG tablet Take 15 mg by mouth 3 (three) times daily.      carBAMazepine (TEGRETOL) 200 mg tablet Take 200 mg by mouth 2 (two) times daily.      citalopram (CELEXA) 40 MG tablet Take 40 mg by mouth.      clonazePAM (KLONOPIN) 1 MG tablet Take 1 mg by mouth 3 (three) times daily as needed.      cyanocobalamin (VITAMIN B-12) 1000 MCG tablet Take 1 tablet by mouth once daily.      cyclobenzaprine (FLEXERIL) 10 MG tablet Take 10 mg by mouth 3 (three) times daily as needed.      dicyclomine (BENTYL) 20 mg tablet Take 20 mg by mouth every 6 (six) hours.      divalproex (DEPAKOTE) 500 MG TbEC Take 250 mg by mouth every 8 (eight) hours.      flecainide (TAMBOCOR) 50 MG Tab Take 50 mg by mouth 2 (two) times daily.      fluticasone propionate (FLONASE) 50 mcg/actuation nasal spray 1 spray by Each Nostril route once daily.      gabapentin (NEURONTIN) 300  MG capsule Take 300 mg by mouth 3 (three) times daily.      HYDROcodone-acetaminophen (NORCO)  mg per tablet Take 1 tablet by mouth.      hydrOXYzine (ATARAX) 50 MG tablet Take 50 mg by mouth 4 (four) times daily.      LIDOcaine (LIDODERM) 5 % Place 1 patch onto the skin every 24 hours. Remove & Discard patch within 12 hours or as directed by MD      meloxicam (MOBIC) 7.5 MG tablet Take 7.5 mg by mouth once daily.      midodrine (PROAMATINE) 5 MG Tab Take 5 mg by mouth.      mirtazapine (REMERON) 30 MG tablet Take 30 mg by mouth every evening.      naloxone (NARCAN) 4 mg/actuation Spry USE 1 SPRAY IN ONE NOSTRIL ONCE AS DIRECTED FOR OPIATE REVERSAL  FOR OPIOID OVERDOSE. DO NOT PRIME NASAL SPRAY. GIVE ADDITIONAL DOSE IF  PATIENT DOES NOT RESPOND WITHIN 2-3 MINUTES OR RESPONDS BUT STOPS  BREATHING AGAIN.  CALL 911.  IF USED, NOTIFY YOUR PROVIDER FOR OPIATE REVERSAL  FOR OPIOID OVERDOSE. DO NOT PRIME NASAL SPRAY. GIVE ADDITIONAL DOSE IF  PATIENT DOES NOT RESPOND WITHIN 2-3 MINUTES OR RESPONDS BUT STOPS  BREATHING AGAIN.  CALL 911.  IF USED, NOTIFY YOUR PROVIDER      pantoprazole (PROTONIX) 40 MG tablet Take 40 mg by mouth.      pravastatin (PRAVACHOL) 20 MG tablet Take 20 mg by mouth once daily.      prazosin (MINIPRESS) 2 MG Cap Take by mouth 2 (two) times daily.      primidone (MYSOLINE) 50 MG Tab Take by mouth.      ramelteon (ROZEREM) 8 mg tablet Take 8 mg by mouth every evening.      senna (SENOKOT) 8.6 mg tablet Take 17.2 mg by mouth once daily.      tamsulosin (FLOMAX) 0.4 mg Cap 0.4 mg.      testosterone cypionate (DEPOTESTOTERONE CYPIONATE) 200 mg/mL injection INJECT 200MG/1ML INTRAMUSCULARLY EVERY 2 WEEKS      testosterone enanthate (DELATESTRYL) 200 mg/mL injection Inject 200 mg into the muscle every 14 (fourteen) days.      topiramate (TOPAMAX) 200 MG Tab Take 200 mg by mouth.      traZODone (DESYREL) 100 MG tablet Take 100 mg by mouth.      venlafaxine (EFFEXOR) 75 MG tablet Take 75 mg by mouth 2  (two) times daily.      verapamiL (CALAN) 120 MG tablet Take 120 mg by mouth 3 (three) times daily.         Allergies  Review of patient's allergies indicates:  No Known Allergies    Physical Examination     Vitals:    09/26/22 1247   BP: (!) 135/96   Pulse: 89     Spine Musculoskeletal Exam    Gait    Gait is normal.    Inspection    Cervical Spine    Cervical spine inspection is normal.    Range of Motion    Cervical Spine        Cervical spine range of motion additional comments: Restricted ROM in neck due to pain.    Strength    Cervical Spine    Cervical spine motor exam is normal.    Sensory    Cervical Spine    Cervical spine sensation is normal.    General      Constitutional: appears stated age, well-developed and well-nourished    Scleral icterus: no    Labored breathing: no    Psychiatric: normal mood and affect and no acute distress    Neurological: alert and oriented x3    Skin: intact    Lymphadenopathy: none     Assessment and Plan (including Health Maintenance)      Problem List  Smart Sets  Document Outside HM   :    Plan:   Chronic back pain greater than 3 months duration    Lumbar degenerative disc disease    Cervicalgia    Cervical radiculitis    DDD (degenerative disc disease), cervical     Schedule ATUL (C7-T1)/    Problem List Items Addressed This Visit          Orthopedic Problems    Lumbar degenerative disc disease    DDD (degenerative disc disease), cervical       Other    Chronic back pain greater than 3 months duration - Primary    Cervicalgia    Cervical radiculitis         Future Appointments   Date Time Provider Department Center   10/27/2022 11:30 AM [facility] [place]        There are no Patient Instructions on file for this visit.  No follow-ups on file.     Signature:  Beth Brooks MD      Date of encounter: 9/26/22

## 2022-09-27 DIAGNOSIS — M50.30 DDD (DEGENERATIVE DISC DISEASE), CERVICAL: ICD-10-CM

## 2022-09-27 DIAGNOSIS — M54.12 CERVICAL RADICULITIS: ICD-10-CM

## 2022-09-27 DIAGNOSIS — M54.2 CERVICALGIA: Primary | ICD-10-CM

## 2022-10-07 ENCOUNTER — ANESTHESIA EVENT (OUTPATIENT)
Dept: SURGERY | Facility: HOSPITAL | Age: 47
End: 2022-10-07
Payer: OTHER GOVERNMENT

## 2022-10-12 ENCOUNTER — HOSPITAL ENCOUNTER (OUTPATIENT)
Facility: HOSPITAL | Age: 47
Discharge: HOME | End: 2022-10-12
Attending: ANESTHESIOLOGY | Admitting: ANESTHESIOLOGY
Payer: OTHER GOVERNMENT

## 2022-10-12 ENCOUNTER — ANESTHESIA (OUTPATIENT)
Dept: SURGERY | Facility: HOSPITAL | Age: 47
End: 2022-10-12
Payer: OTHER GOVERNMENT

## 2022-10-12 VITALS
TEMPERATURE: 98 F | RESPIRATION RATE: 17 BRPM | OXYGEN SATURATION: 97 % | WEIGHT: 218.06 LBS | BODY MASS INDEX: 29.54 KG/M2 | DIASTOLIC BLOOD PRESSURE: 97 MMHG | SYSTOLIC BLOOD PRESSURE: 133 MMHG | HEART RATE: 61 BPM | HEIGHT: 72 IN

## 2022-10-12 DIAGNOSIS — G89.29 CHRONIC NECK PAIN: ICD-10-CM

## 2022-10-12 DIAGNOSIS — M54.2 CHRONIC NECK PAIN: ICD-10-CM

## 2022-10-12 DIAGNOSIS — M54.2 CERVICALGIA: Primary | ICD-10-CM

## 2022-10-12 PROCEDURE — 25000003 PHARM REV CODE 250: Performed by: ANESTHESIOLOGY

## 2022-10-12 PROCEDURE — 37000008 HC ANESTHESIA 1ST 15 MINUTES: Performed by: ANESTHESIOLOGY

## 2022-10-12 PROCEDURE — 01992 ANES DX/THER NRV BLK&INJ PRN: CPT | Performed by: ANESTHESIOLOGY

## 2022-10-12 PROCEDURE — 25000003 PHARM REV CODE 250: Performed by: NURSE ANESTHETIST, CERTIFIED REGISTERED

## 2022-10-12 PROCEDURE — 63600175 PHARM REV CODE 636 W HCPCS: Performed by: ANESTHESIOLOGY

## 2022-10-12 PROCEDURE — 62321 PR INJ CERV/THORAC, W/GUIDANCE: ICD-10-PCS | Mod: ,,, | Performed by: ANESTHESIOLOGY

## 2022-10-12 PROCEDURE — A4216 STERILE WATER/SALINE, 10 ML: HCPCS | Performed by: ANESTHESIOLOGY

## 2022-10-12 PROCEDURE — 62321 NJX INTERLAMINAR CRV/THRC: CPT | Mod: ,,, | Performed by: ANESTHESIOLOGY

## 2022-10-12 PROCEDURE — 63600175 PHARM REV CODE 636 W HCPCS: Performed by: NURSE ANESTHETIST, CERTIFIED REGISTERED

## 2022-10-12 PROCEDURE — 62321 NJX INTERLAMINAR CRV/THRC: CPT | Performed by: ANESTHESIOLOGY

## 2022-10-12 RX ORDER — LIDOCAINE HYDROCHLORIDE 10 MG/ML
1 INJECTION, SOLUTION EPIDURAL; INFILTRATION; INTRACAUDAL; PERINEURAL ONCE
Status: DISCONTINUED | OUTPATIENT
Start: 2022-10-12 | End: 2022-10-12 | Stop reason: HOSPADM

## 2022-10-12 RX ORDER — LIDOCAINE HYDROCHLORIDE 20 MG/ML
INJECTION, SOLUTION EPIDURAL; INFILTRATION; INTRACAUDAL; PERINEURAL
Status: DISCONTINUED | OUTPATIENT
Start: 2022-10-12 | End: 2022-10-12

## 2022-10-12 RX ORDER — SODIUM CHLORIDE 9 MG/ML
INJECTION, SOLUTION INTRAMUSCULAR; INTRAVENOUS; SUBCUTANEOUS
Status: DISCONTINUED | OUTPATIENT
Start: 2022-10-12 | End: 2022-10-12 | Stop reason: HOSPADM

## 2022-10-12 RX ORDER — SODIUM CHLORIDE, SODIUM GLUCONATE, SODIUM ACETATE, POTASSIUM CHLORIDE AND MAGNESIUM CHLORIDE 30; 37; 368; 526; 502 MG/100ML; MG/100ML; MG/100ML; MG/100ML; MG/100ML
1000 INJECTION, SOLUTION INTRAVENOUS CONTINUOUS
Status: DISCONTINUED | OUTPATIENT
Start: 2022-10-12 | End: 2022-10-12 | Stop reason: HOSPADM

## 2022-10-12 RX ORDER — LIDOCAINE HYDROCHLORIDE 10 MG/ML
INJECTION, SOLUTION EPIDURAL; INFILTRATION; INTRACAUDAL; PERINEURAL
Status: DISCONTINUED | OUTPATIENT
Start: 2022-10-12 | End: 2022-10-12 | Stop reason: HOSPADM

## 2022-10-12 RX ORDER — DEXAMETHASONE SODIUM PHOSPHATE 10 MG/ML
INJECTION INTRAMUSCULAR; INTRAVENOUS
Status: DISCONTINUED | OUTPATIENT
Start: 2022-10-12 | End: 2022-10-12 | Stop reason: HOSPADM

## 2022-10-12 RX ORDER — LIDOCAINE HYDROCHLORIDE 10 MG/ML
INJECTION, SOLUTION EPIDURAL; INFILTRATION; INTRACAUDAL; PERINEURAL
Status: DISCONTINUED
Start: 2022-10-12 | End: 2022-10-12 | Stop reason: HOSPADM

## 2022-10-12 RX ORDER — PROPOFOL 10 MG/ML
VIAL (ML) INTRAVENOUS
Status: DISCONTINUED | OUTPATIENT
Start: 2022-10-12 | End: 2022-10-12

## 2022-10-12 RX ORDER — DEXAMETHASONE SODIUM PHOSPHATE 10 MG/ML
INJECTION INTRAMUSCULAR; INTRAVENOUS
Status: DISCONTINUED
Start: 2022-10-12 | End: 2022-10-12 | Stop reason: HOSPADM

## 2022-10-12 RX ORDER — BUPIVACAINE HYDROCHLORIDE 2.5 MG/ML
INJECTION, SOLUTION EPIDURAL; INFILTRATION; INTRACAUDAL
Status: DISCONTINUED
Start: 2022-10-12 | End: 2022-10-12 | Stop reason: HOSPADM

## 2022-10-12 RX ADMIN — SODIUM CHLORIDE, SODIUM GLUCONATE, SODIUM ACETATE, POTASSIUM CHLORIDE AND MAGNESIUM CHLORIDE 1000 ML: 526; 502; 368; 37; 30 INJECTION, SOLUTION INTRAVENOUS at 11:10

## 2022-10-12 RX ADMIN — SODIUM CHLORIDE, SODIUM GLUCONATE, SODIUM ACETATE, POTASSIUM CHLORIDE AND MAGNESIUM CHLORIDE: 526; 502; 368; 37; 30 INJECTION, SOLUTION INTRAVENOUS at 11:10

## 2022-10-12 RX ADMIN — LIDOCAINE HYDROCHLORIDE 5 MG: 20 INJECTION, SOLUTION EPIDURAL; INFILTRATION; INTRACAUDAL; PERINEURAL at 12:10

## 2022-10-12 RX ADMIN — PROPOFOL 50 MG: 10 INJECTION, EMULSION INTRAVENOUS at 12:10

## 2022-10-12 RX ADMIN — PROPOFOL 40 MG: 10 INJECTION, EMULSION INTRAVENOUS at 12:10

## 2022-10-12 NOTE — ANESTHESIA PREPROCEDURE EVALUATION
10/12/2022  Marlon Franco is a 47 y.o., male who presents with chronic Neck/Arm and shoulder pain.  Diagnosis:        Cervicalgia       Cervical radiculitis       DDD (degenerative disc disease), cervical       (Cervicalgia [M54.2])       (Cervical radiculitis [M54.12])       (DDD (degenerative disc disease), cervical [M50.30])        [facility] for the noted procedure under IV Sedation.  Procedure: Injection-steroid-epidural-cervical (Spine Cervical)    PMHx:  Problem List  Current as of 10/12/22 0950  Chronic back pain greater than 3 months duration Sacroiliitis   Lumbar degenerative disc disease Cervicalgia   Cervical radiculitis      Low back pain    Other Medical History   Carpal tunnel syndrome of left wrist Carpal tunnel syndrome of right wrist   Essential tremor GERD (gastroesophageal reflux disease)   HTN (hypertension) Lumbar radiculitis   Lumbar spinal stenosis Piriformis syndrome   PTSD (post-traumatic stress disorder) Sacroiliitis   Sleep apnea, unspecified        Surgical History/PSHx:  INJECTION OF PIRIFORMIS MUSCLE TRIGGER POINT INJECTION   TRANSFORAMINAL EPIDURAL INJECTION OF STEROID INJECTION, SACROILIAC JOINT   BUNIONECTOMY ANKLE SURGERY   ORIF LEFT ARM SURGICAL REMOVAL OF PILONIDAL CYST   REPAIR OF SCROTUM              Vital signs:  Pre Vitals  Current as of 10/12/22 1258  BP: 133/97 Pulse: 61   Resp: 17 SpO2: 97   Temp:    Height: 6' (1.829 m) (10/12/22) Weight: 98.9 kg (218 lb 0.6 oz) (10/12/22)   BMI: 29.6 IBW: 77.6 kg (171 lb 1.9 oz)   Last edited 10/12/22 1243 by BRET      Pre-op Assessment    I have reviewed the Patient Summary Reports.     I have reviewed the Nursing Notes. I have reviewed the NPO Status.   I have reviewed the Medications.     Review of Systems  Anesthesia Hx:  No problems with previous Anesthesia    Social:  Non-Smoker    Hematology/Oncology:  Hematology  Normal   Oncology Normal     EENT/Dental:EENT/Dental Normal   Cardiovascular:   Exercise tolerance: good Hypertension  Functional Capacity good / => 4 METS    Pulmonary:   Sleep Apnea    Renal/:  Renal/ Normal     Hepatic/GI:   GERD    Musculoskeletal:   Arthritis     Neurological:   Neuromuscular Disease,    Endocrine:  Endocrine Normal    Dermatological:  Skin Normal    Psych:   Psychiatric History          Physical Exam  General: Alert, Oriented, Well nourished and Cooperative    Airway:  Mallampati: II   Mouth Opening: Normal  TM Distance: Normal  Tongue: Normal  Neck ROM: Normal ROM    Dental:  Intact    Chest/Lungs:  Clear to auscultation, Normal Respiratory Rate    Heart:  Rate: Normal  Rhythm: Regular Rhythm        Anesthesia Plan  Type of Anesthesia, risks & benefits discussed:    Anesthesia Type: MAC, Gen Natural Airway  Intra-op Monitoring Plan: Standard ASA Monitors  Post Op Pain Control Plan: IV/PO Opioids PRN  Induction:  IV  ASA Score: 2  Day of Surgery Review of History & Physical: H&P Update referred to the surgeon/provider.    Ready For Surgery From Anesthesia Perspective.     .

## 2022-10-12 NOTE — DISCHARGE SUMMARY
[facility] - Periop Services  Discharge Note  Short Stay    Procedure(s) (LRB):  Injection-steroid-epidural-cervical (N/A)      OUTCOME: Patient tolerated treatment/procedure well without complication and is now ready for discharge.    DISPOSITION: Home or Self Care    FINAL DIAGNOSIS:  <principal problem not specified>    FOLLOWUP: In clinic    DISCHARGE INSTRUCTIONS:  No discharge procedures on file.     TIME SPENT ON DISCHARGE: 5 minutes

## 2022-10-12 NOTE — PLAN OF CARE
Preparing patient for discharge.vital signs stable. Patient verbalizes understanding of discharge instructions.

## 2022-10-12 NOTE — TRANSFER OF CARE
Anesthesia Transfer of Care Note    Patient: Marlon Franco    Procedure(s) Performed: Procedure(s) (LRB):  Injection-steroid-epidural-cervical (N/A)    Patient location: OPS    Anesthesia Type: MAC    Transport from OR: Transported from OR on room air with adequate spontaneous ventilation    Post pain: adequate analgesia    Post assessment: no apparent anesthetic complications    Post vital signs: stable    Level of consciousness: awake, alert and oriented    Nausea/Vomiting: no nausea/vomiting    Complications: none    Transfer of care protocol was followed      Last vitals:   Visit Vitals  BP (!) 129/95   Pulse 69   Temp 36.7 °C (98.1 °F)   Resp 18   Ht 6' (1.829 m)   Wt 98.9 kg (218 lb 0.6 oz)   SpO2 99%   BMI 29.57 kg/m²

## 2022-10-14 NOTE — ANESTHESIA POSTPROCEDURE EVALUATION
Anesthesia Post Evaluation    Patient: Marlon Franco    Procedure(s) Performed: Procedure(s) (LRB):  Injection-steroid-epidural-cervical (N/A)    Final Anesthesia Type: MAC      Patient location during evaluation: OPS  Patient participation: Yes- Able to Participate  Level of consciousness: awake and alert and oriented  Post-procedure vital signs: reviewed and stable  Pain management: adequate  Airway patency: patent    PONV status at discharge: No PONV, vomiting (controlled) and nausea (controlled)  Anesthetic complications: no      Cardiovascular status: stable and blood pressure returned to baseline  Respiratory status: unassisted  Hydration status: euvolemic            Vitals Value Taken Time   /98 10/12/22 1243   Temp 36.7 °C (98.1 °F) 10/12/22 1053   Pulse 61 10/12/22 1243   Resp 17 10/12/22 1243   SpO2 91 % 10/12/22 1243   Vitals shown include unvalidated device data.      No case tracking events are documented in the log.      Pain/Amos Score: No data recorded

## 2022-10-27 ENCOUNTER — OFFICE VISIT (OUTPATIENT)
Dept: ORTHOPEDICS | Facility: CLINIC | Age: 47
End: 2022-10-27
Payer: OTHER GOVERNMENT

## 2022-10-27 VITALS
WEIGHT: 218 LBS | BODY MASS INDEX: 29.53 KG/M2 | DIASTOLIC BLOOD PRESSURE: 93 MMHG | HEIGHT: 72 IN | SYSTOLIC BLOOD PRESSURE: 119 MMHG | HEART RATE: 84 BPM

## 2022-10-27 DIAGNOSIS — M50.30 DDD (DEGENERATIVE DISC DISEASE), CERVICAL: ICD-10-CM

## 2022-10-27 DIAGNOSIS — M54.2 CERVICALGIA: ICD-10-CM

## 2022-10-27 DIAGNOSIS — M54.12 CERVICAL RADICULITIS: Primary | ICD-10-CM

## 2022-10-27 DIAGNOSIS — G89.29 CHRONIC BACK PAIN GREATER THAN 3 MONTHS DURATION: ICD-10-CM

## 2022-10-27 DIAGNOSIS — M46.1 SACROILIITIS: ICD-10-CM

## 2022-10-27 DIAGNOSIS — M54.9 CHRONIC BACK PAIN GREATER THAN 3 MONTHS DURATION: ICD-10-CM

## 2022-10-27 PROCEDURE — 99213 OFFICE O/P EST LOW 20 MIN: CPT | Mod: ,,, | Performed by: ANESTHESIOLOGY

## 2022-10-27 PROCEDURE — 99213 PR OFFICE/OUTPT VISIT, EST, LEVL III, 20-29 MIN: ICD-10-PCS | Mod: ,,, | Performed by: ANESTHESIOLOGY

## 2022-10-27 NOTE — PROGRESS NOTES
Beth Brooks MD        PATIENT NAME: Marlon Franco  : 1975  DATE: 10/27/22  MRN: [id number]      Billing Provider: Beth Brooks MD  Level of Service:   Patient PCP Information       Provider PCP Type    Primary Doctor No General            Reason for Visit / Chief Complaint: Post-op Evaluation (F/u post op injection. Injections gave very little release. Pain is a 8/10 on worse day. Unable to excersise. )       Update PCP  Update Chief Complaint         History of Present Illness / Problem Focused Workflow     Marlon Franco presents to the clinic with Post-op Evaluation (F/u post op injection. Injections gave very little release. Pain is a 8/10 on worse day. Unable to excersise. )     Here for f/u of LBP and also c/o neck pain radiating down the left arm to his elbow, as well as low back pain.  He underwent a cervical epidural steroid injection a couple of weeks ago but states he got very little relief from it.  Currently rates his pain as 4/10 on the NRS, but it gets up to 8/10 at worst.  He takes daily over-the-counter pain relievers, which help a little sometimes, but other times they do not help at all.  He is scheduled to see a neurologist in [place] in a couple of weeks.  His main complaint today is pain over his right sacroiliac joint.  He is interested in having another injection for this.    Back Pain  Associated symptoms include numbness.     Review of Systems     Review of Systems   Musculoskeletal:  Positive for back pain, neck pain and neck stiffness.   Neurological:  Positive for dizziness and numbness.   All other systems reviewed and are negative.     Medical / Social / Family History     Past Medical History:   Diagnosis Date    Carpal tunnel syndrome of left wrist     Carpal tunnel syndrome of right wrist     Essential tremor     GERD (gastroesophageal reflux disease)     HTN (hypertension)     Low back pain     Lumbar radiculitis     Lumbar spinal stenosis      Piriformis syndrome     PTSD (post-traumatic stress disorder)     Sacroiliitis     Sleep apnea, unspecified        Past Surgical History:   Procedure Laterality Date    ANKLE SURGERY Left     BUNIONECTOMY Left     EPIDURAL STEROID INJECTION INTO CERVICAL SPINE N/A 10/12/2022    Procedure: Injection-steroid-epidural-cervical;  Surgeon: Beth Brooks MD;  Location: [facility] OR;  Service: Pain Management;  Laterality: N/A;  C7-T1    FRACTURE SURGERY  1981    Broke left arm    INJECTION OF PIRIFORMIS MUSCLE Left 02/16/2022    INJECTION, SACROILIAC JOINT Left 02/16/2022    ORIF LEFT ARM      REPAIR OF SCROTUM      SURGICAL REMOVAL OF PILONIDAL CYST      TRANSFORAMINAL EPIDURAL INJECTION OF STEROID Left 02/24/2021    L3 & L4    TRIGGER POINT INJECTION  10/04/2021       Social History  Mr. Franco  reports that he has never smoked. He has never used smokeless tobacco. He reports that he does not currently use alcohol. He reports that he does not use drugs.    Family History  Mr.'s Franco family history includes Arthritis in his maternal aunt; Asthma in his father; Cancer in his mother; Heart disease in his father.    Medications and Allergies     Medications  Outpatient Medications Marked as Taking for the 10/27/22 encounter (Office Visit) with Beth Brooks MD   Medication Sig Dispense Refill    adalimumab (HUMIRA,CF,) 40 mg/0.4 mL SyKt Inject 40 mg into the skin every 14 (fourteen) days.      allopurinoL (ZYLOPRIM) 100 MG tablet Take 100 mg by mouth once daily.      busPIRone (BUSPAR) 15 MG tablet Take 15 mg by mouth 3 (three) times daily.      carBAMazepine (TEGRETOL) 200 mg tablet Take 200 mg by mouth 2 (two) times daily.      citalopram (CELEXA) 40 MG tablet Take 40 mg by mouth once daily.      clonazePAM (KLONOPIN) 1 MG tablet Take 1 mg by mouth 3 (three) times daily as needed.      cyanocobalamin (VITAMIN B-12) 1000 MCG tablet Take 1 tablet by mouth once daily.      cyclobenzaprine (FLEXERIL) 10 MG tablet Take 10 mg  by mouth 3 (three) times daily as needed.      dicyclomine (BENTYL) 20 mg tablet Take 20 mg by mouth once daily.      divalproex (DEPAKOTE) 500 MG TbEC Take 250 mg by mouth once daily.      flecainide (TAMBOCOR) 50 MG Tab Take 50 mg by mouth 2 (two) times daily.      fluticasone propionate (FLONASE) 50 mcg/actuation nasal spray 1 spray by Each Nostril route as needed.      gabapentin (NEURONTIN) 300 MG capsule Take 300 mg by mouth 3 (three) times daily.      HYDROcodone-acetaminophen (NORCO)  mg per tablet Take 1 tablet by mouth every 4 (four) hours as needed.      hydrOXYzine (ATARAX) 50 MG tablet Take 50 mg by mouth 4 (four) times daily.      LIDOcaine (LIDODERM) 5 % Place 1 patch onto the skin every 24 hours. Remove & Discard patch within 12 hours or as directed by MD      meloxicam (MOBIC) 7.5 MG tablet Take 7.5 mg by mouth once daily.      midodrine (PROAMATINE) 5 MG Tab Take 5 mg by mouth Daily.      mirtazapine (REMERON) 30 MG tablet Take 30 mg by mouth every evening.      naloxone (NARCAN) 4 mg/actuation Spry USE 1 SPRAY IN ONE NOSTRIL ONCE AS DIRECTED FOR OPIATE REVERSAL  FOR OPIOID OVERDOSE. DO NOT PRIME NASAL SPRAY. GIVE ADDITIONAL DOSE IF  PATIENT DOES NOT RESPOND WITHIN 2-3 MINUTES OR RESPONDS BUT STOPS  BREATHING AGAIN.  CALL 911.  IF USED, NOTIFY YOUR PROVIDER FOR OPIATE REVERSAL  FOR OPIOID OVERDOSE. DO NOT PRIME NASAL SPRAY. GIVE ADDITIONAL DOSE IF  PATIENT DOES NOT RESPOND WITHIN 2-3 MINUTES OR RESPONDS BUT STOPS  BREATHING AGAIN.  CALL 911.  IF USED, NOTIFY YOUR PROVIDER      pantoprazole (PROTONIX) 40 MG tablet Take 40 mg by mouth once daily.      pravastatin (PRAVACHOL) 20 MG tablet Take 20 mg by mouth every evening.      prazosin (MINIPRESS) 2 MG Cap Take 4 mg by mouth every evening.      primidone (MYSOLINE) 50 MG Tab Take 50 mg by mouth 2 (two) times a day.      ramelteon (ROZEREM) 8 mg tablet Take 8 mg by mouth every evening.      senna (SENOKOT) 8.6 mg tablet Take 17.2 mg by mouth  once daily.      tamsulosin (FLOMAX) 0.4 mg Cap 0.4 mg once daily.      testosterone cypionate (DEPOTESTOTERONE CYPIONATE) 200 mg/mL injection 200 mg every 14 (fourteen) days.      testosterone enanthate (DELATESTRYL) 200 mg/mL injection Inject 200 mg into the muscle every 14 (fourteen) days.      topiramate (TOPAMAX) 200 MG Tab Take 200 mg by mouth.      traZODone (DESYREL) 100 MG tablet Take 100 mg by mouth every evening.      venlafaxine (EFFEXOR) 75 MG tablet Take 75 mg by mouth 2 (two) times daily.      verapamiL (CALAN) 120 MG tablet Take 120 mg by mouth 3 (three) times daily.         Allergies  Review of patient's allergies indicates:  No Known Allergies    Physical Examination     Vitals:    10/27/22 1139   BP: (!) 119/93   Pulse: 84     Spine Musculoskeletal Exam    Gait    Gait is normal.    Inspection    Cervical Spine    Cervical spine inspection is normal.    Palpation    Thoracolumbar    Tenderness: present      Piriformis: right      SI Joint: right    Range of Motion    Cervical Spine        Cervical spine range of motion additional comments: Restricted ROM in neck due to pain.    Strength    Cervical Spine    Cervical spine motor exam is normal.    Sensory    Cervical Spine    Cervical spine sensation is normal.    Special Tests    Thoracolumbar      Right      DENA test: positive    General      Constitutional: appears stated age, well-developed and well-nourished    Scleral icterus: no    Labored breathing: no    Psychiatric: normal mood and affect and no acute distress    Neurological: alert and oriented x3    Skin: intact    Lymphadenopathy: none     Assessment and Plan (including Health Maintenance)      Problem List  Smart Sets  Document Outside HM   :    Plan:   Cervical radiculitis    DDD (degenerative disc disease), cervical    Cervicalgia    Chronic back pain greater than 3 months duration    Sacroiliitis     I am scheduling him today for right sacroiliac joint injection for his worsening  right-sided low back pain over his sacroiliac joint, with positive [place] point and positive DENA today.  The plan was discussed with the patient and he wishes to proceed.  He is seeing a neurologist in [place] later this month about his neck.    Problem List Items Addressed This Visit          Orthopedic Problems    Sacroiliitis    DDD (degenerative disc disease), cervical       Other    Chronic back pain greater than 3 months duration    Cervicalgia    Cervical radiculitis - Primary         No future appointments.       There are no Patient Instructions on file for this visit.  No follow-ups on file.     Signature:  Beth Brooks MD      Date of encounter: 10/27/22

## 2022-10-27 NOTE — H&P (VIEW-ONLY)
Beth Brooks MD        PATIENT NAME: Marlon Franco  : 1975  DATE: 10/27/22  MRN: [id number]      Billing Provider: Beth Brooks MD  Level of Service:   Patient PCP Information       Provider PCP Type    Primary Doctor No General            Reason for Visit / Chief Complaint: Post-op Evaluation (F/u post op injection. Injections gave very little release. Pain is a 8/10 on worse day. Unable to excersise. )       Update PCP  Update Chief Complaint         History of Present Illness / Problem Focused Workflow     Marlon Franco presents to the clinic with Post-op Evaluation (F/u post op injection. Injections gave very little release. Pain is a 8/10 on worse day. Unable to excersise. )     Here for f/u of LBP and also c/o neck pain radiating down the left arm to his elbow, as well as low back pain.  He underwent a cervical epidural steroid injection a couple of weeks ago but states he got very little relief from it.  Currently rates his pain as 4/10 on the NRS, but it gets up to 8/10 at worst.  He takes daily over-the-counter pain relievers, which help a little sometimes, but other times they do not help at all.  He is scheduled to see a neurologist in [place] in a couple of weeks.  His main complaint today is pain over his right sacroiliac joint.  He is interested in having another injection for this.    Back Pain  Associated symptoms include numbness.     Review of Systems     Review of Systems   Musculoskeletal:  Positive for back pain, neck pain and neck stiffness.   Neurological:  Positive for dizziness and numbness.   All other systems reviewed and are negative.     Medical / Social / Family History     Past Medical History:   Diagnosis Date    Carpal tunnel syndrome of left wrist     Carpal tunnel syndrome of right wrist     Essential tremor     GERD (gastroesophageal reflux disease)     HTN (hypertension)     Low back pain     Lumbar radiculitis     Lumbar spinal stenosis      Piriformis syndrome     PTSD (post-traumatic stress disorder)     Sacroiliitis     Sleep apnea, unspecified        Past Surgical History:   Procedure Laterality Date    ANKLE SURGERY Left     BUNIONECTOMY Left     EPIDURAL STEROID INJECTION INTO CERVICAL SPINE N/A 10/12/2022    Procedure: Injection-steroid-epidural-cervical;  Surgeon: Beth Brooks MD;  Location: [facility] OR;  Service: Pain Management;  Laterality: N/A;  C7-T1    FRACTURE SURGERY  1981    Broke left arm    INJECTION OF PIRIFORMIS MUSCLE Left 02/16/2022    INJECTION, SACROILIAC JOINT Left 02/16/2022    ORIF LEFT ARM      REPAIR OF SCROTUM      SURGICAL REMOVAL OF PILONIDAL CYST      TRANSFORAMINAL EPIDURAL INJECTION OF STEROID Left 02/24/2021    L3 & L4    TRIGGER POINT INJECTION  10/04/2021       Social History  Mr. Franco  reports that he has never smoked. He has never used smokeless tobacco. He reports that he does not currently use alcohol. He reports that he does not use drugs.    Family History  Mr.'s Franco family history includes Arthritis in his maternal aunt; Asthma in his father; Cancer in his mother; Heart disease in his father.    Medications and Allergies     Medications  Outpatient Medications Marked as Taking for the 10/27/22 encounter (Office Visit) with Beth Brooks MD   Medication Sig Dispense Refill    adalimumab (HUMIRA,CF,) 40 mg/0.4 mL SyKt Inject 40 mg into the skin every 14 (fourteen) days.      allopurinoL (ZYLOPRIM) 100 MG tablet Take 100 mg by mouth once daily.      busPIRone (BUSPAR) 15 MG tablet Take 15 mg by mouth 3 (three) times daily.      carBAMazepine (TEGRETOL) 200 mg tablet Take 200 mg by mouth 2 (two) times daily.      citalopram (CELEXA) 40 MG tablet Take 40 mg by mouth once daily.      clonazePAM (KLONOPIN) 1 MG tablet Take 1 mg by mouth 3 (three) times daily as needed.      cyanocobalamin (VITAMIN B-12) 1000 MCG tablet Take 1 tablet by mouth once daily.      cyclobenzaprine (FLEXERIL) 10 MG tablet Take 10 mg  by mouth 3 (three) times daily as needed.      dicyclomine (BENTYL) 20 mg tablet Take 20 mg by mouth once daily.      divalproex (DEPAKOTE) 500 MG TbEC Take 250 mg by mouth once daily.      flecainide (TAMBOCOR) 50 MG Tab Take 50 mg by mouth 2 (two) times daily.      fluticasone propionate (FLONASE) 50 mcg/actuation nasal spray 1 spray by Each Nostril route as needed.      gabapentin (NEURONTIN) 300 MG capsule Take 300 mg by mouth 3 (three) times daily.      HYDROcodone-acetaminophen (NORCO)  mg per tablet Take 1 tablet by mouth every 4 (four) hours as needed.      hydrOXYzine (ATARAX) 50 MG tablet Take 50 mg by mouth 4 (four) times daily.      LIDOcaine (LIDODERM) 5 % Place 1 patch onto the skin every 24 hours. Remove & Discard patch within 12 hours or as directed by MD      meloxicam (MOBIC) 7.5 MG tablet Take 7.5 mg by mouth once daily.      midodrine (PROAMATINE) 5 MG Tab Take 5 mg by mouth Daily.      mirtazapine (REMERON) 30 MG tablet Take 30 mg by mouth every evening.      naloxone (NARCAN) 4 mg/actuation Spry USE 1 SPRAY IN ONE NOSTRIL ONCE AS DIRECTED FOR OPIATE REVERSAL  FOR OPIOID OVERDOSE. DO NOT PRIME NASAL SPRAY. GIVE ADDITIONAL DOSE IF  PATIENT DOES NOT RESPOND WITHIN 2-3 MINUTES OR RESPONDS BUT STOPS  BREATHING AGAIN.  CALL 911.  IF USED, NOTIFY YOUR PROVIDER FOR OPIATE REVERSAL  FOR OPIOID OVERDOSE. DO NOT PRIME NASAL SPRAY. GIVE ADDITIONAL DOSE IF  PATIENT DOES NOT RESPOND WITHIN 2-3 MINUTES OR RESPONDS BUT STOPS  BREATHING AGAIN.  CALL 911.  IF USED, NOTIFY YOUR PROVIDER      pantoprazole (PROTONIX) 40 MG tablet Take 40 mg by mouth once daily.      pravastatin (PRAVACHOL) 20 MG tablet Take 20 mg by mouth every evening.      prazosin (MINIPRESS) 2 MG Cap Take 4 mg by mouth every evening.      primidone (MYSOLINE) 50 MG Tab Take 50 mg by mouth 2 (two) times a day.      ramelteon (ROZEREM) 8 mg tablet Take 8 mg by mouth every evening.      senna (SENOKOT) 8.6 mg tablet Take 17.2 mg by mouth  once daily.      tamsulosin (FLOMAX) 0.4 mg Cap 0.4 mg once daily.      testosterone cypionate (DEPOTESTOTERONE CYPIONATE) 200 mg/mL injection 200 mg every 14 (fourteen) days.      testosterone enanthate (DELATESTRYL) 200 mg/mL injection Inject 200 mg into the muscle every 14 (fourteen) days.      topiramate (TOPAMAX) 200 MG Tab Take 200 mg by mouth.      traZODone (DESYREL) 100 MG tablet Take 100 mg by mouth every evening.      venlafaxine (EFFEXOR) 75 MG tablet Take 75 mg by mouth 2 (two) times daily.      verapamiL (CALAN) 120 MG tablet Take 120 mg by mouth 3 (three) times daily.         Allergies  Review of patient's allergies indicates:  No Known Allergies    Physical Examination     Vitals:    10/27/22 1139   BP: (!) 119/93   Pulse: 84     Spine Musculoskeletal Exam    Gait    Gait is normal.    Inspection    Cervical Spine    Cervical spine inspection is normal.    Palpation    Thoracolumbar    Tenderness: present      Piriformis: right      SI Joint: right    Range of Motion    Cervical Spine        Cervical spine range of motion additional comments: Restricted ROM in neck due to pain.    Strength    Cervical Spine    Cervical spine motor exam is normal.    Sensory    Cervical Spine    Cervical spine sensation is normal.    Special Tests    Thoracolumbar      Right      DENA test: positive    General      Constitutional: appears stated age, well-developed and well-nourished    Scleral icterus: no    Labored breathing: no    Psychiatric: normal mood and affect and no acute distress    Neurological: alert and oriented x3    Skin: intact    Lymphadenopathy: none     Assessment and Plan (including Health Maintenance)      Problem List  Smart Sets  Document Outside HM   :    Plan:   Cervical radiculitis    DDD (degenerative disc disease), cervical    Cervicalgia    Chronic back pain greater than 3 months duration    Sacroiliitis     I am scheduling him today for right sacroiliac joint injection for his worsening  right-sided low back pain over his sacroiliac joint, with positive [place] point and positive DENA today.  The plan was discussed with the patient and he wishes to proceed.  He is seeing a neurologist in [place] later this month about his neck.    Problem List Items Addressed This Visit          Orthopedic Problems    Sacroiliitis    DDD (degenerative disc disease), cervical       Other    Chronic back pain greater than 3 months duration    Cervicalgia    Cervical radiculitis - Primary         No future appointments.       There are no Patient Instructions on file for this visit.  No follow-ups on file.     Signature:  Beth Brooks MD      Date of encounter: 10/27/22

## 2022-11-16 ENCOUNTER — HOSPITAL ENCOUNTER (OUTPATIENT)
Dept: RADIOLOGY | Facility: HOSPITAL | Age: 47
Discharge: HOME | End: 2022-11-16
Attending: ANESTHESIOLOGY
Payer: OTHER GOVERNMENT

## 2022-11-16 ENCOUNTER — ANESTHESIA (OUTPATIENT)
Dept: SURGERY | Facility: HOSPITAL | Age: 47
End: 2022-11-16
Payer: OTHER GOVERNMENT

## 2022-11-16 ENCOUNTER — HOSPITAL ENCOUNTER (OUTPATIENT)
Facility: HOSPITAL | Age: 47
Discharge: HOME | End: 2022-11-16
Attending: ANESTHESIOLOGY | Admitting: ANESTHESIOLOGY
Payer: OTHER GOVERNMENT

## 2022-11-16 ENCOUNTER — ANESTHESIA EVENT (OUTPATIENT)
Dept: SURGERY | Facility: HOSPITAL | Age: 47
End: 2022-11-16
Payer: OTHER GOVERNMENT

## 2022-11-16 VITALS
TEMPERATURE: 98 F | OXYGEN SATURATION: 100 % | SYSTOLIC BLOOD PRESSURE: 135 MMHG | BODY MASS INDEX: 29.92 KG/M2 | HEART RATE: 71 BPM | HEIGHT: 72 IN | RESPIRATION RATE: 18 BRPM | DIASTOLIC BLOOD PRESSURE: 96 MMHG | WEIGHT: 220.88 LBS

## 2022-11-16 DIAGNOSIS — G89.29 CHRONIC LUMBAR PAIN: ICD-10-CM

## 2022-11-16 DIAGNOSIS — M54.50 CHRONIC LUMBAR PAIN: ICD-10-CM

## 2022-11-16 DIAGNOSIS — M46.1 SACROILIITIS: Primary | ICD-10-CM

## 2022-11-16 PROCEDURE — 76000 FLUOROSCOPY <1 HR PHYS/QHP: CPT | Mod: TC

## 2022-11-16 PROCEDURE — 25000003 PHARM REV CODE 250: Performed by: ANESTHESIOLOGY

## 2022-11-16 PROCEDURE — 25000003 PHARM REV CODE 250: Performed by: NURSE ANESTHETIST, CERTIFIED REGISTERED

## 2022-11-16 PROCEDURE — 37000008 HC ANESTHESIA 1ST 15 MINUTES: Performed by: ANESTHESIOLOGY

## 2022-11-16 PROCEDURE — 01160 ANES CLSD PX SYMPH PUB/SI JT: CPT | Performed by: ANESTHESIOLOGY

## 2022-11-16 PROCEDURE — 63600175 PHARM REV CODE 636 W HCPCS: Performed by: ANESTHESIOLOGY

## 2022-11-16 PROCEDURE — 63600175 PHARM REV CODE 636 W HCPCS: Performed by: NURSE ANESTHETIST, CERTIFIED REGISTERED

## 2022-11-16 PROCEDURE — 27096 INJECT SACROILIAC JOINT: CPT | Performed by: ANESTHESIOLOGY

## 2022-11-16 PROCEDURE — 27096 INJECT SACROILIAC JOINT: CPT | Mod: RT,,, | Performed by: ANESTHESIOLOGY

## 2022-11-16 PROCEDURE — 27096 PR INJECTION,SACROILIAC JOINT: ICD-10-PCS | Mod: RT,,, | Performed by: ANESTHESIOLOGY

## 2022-11-16 RX ORDER — HYDROMORPHONE HYDROCHLORIDE 2 MG/ML
0.2 INJECTION, SOLUTION INTRAMUSCULAR; INTRAVENOUS; SUBCUTANEOUS EVERY 5 MIN PRN
Status: CANCELLED | OUTPATIENT
Start: 2022-11-16

## 2022-11-16 RX ORDER — LIDOCAINE HYDROCHLORIDE 10 MG/ML
1 INJECTION, SOLUTION EPIDURAL; INFILTRATION; INTRACAUDAL; PERINEURAL ONCE
Status: DISCONTINUED | OUTPATIENT
Start: 2022-11-16 | End: 2022-11-16 | Stop reason: HOSPADM

## 2022-11-16 RX ORDER — TRIAMCINOLONE ACETONIDE 40 MG/ML
INJECTION, SUSPENSION INTRA-ARTICULAR; INTRAMUSCULAR
Status: DISCONTINUED
Start: 2022-11-16 | End: 2022-11-16 | Stop reason: HOSPADM

## 2022-11-16 RX ORDER — LIDOCAINE HYDROCHLORIDE 10 MG/ML
INJECTION, SOLUTION EPIDURAL; INFILTRATION; INTRACAUDAL; PERINEURAL
Status: DISCONTINUED | OUTPATIENT
Start: 2022-11-16 | End: 2022-11-16 | Stop reason: HOSPADM

## 2022-11-16 RX ORDER — HYDROCODONE BITARTRATE AND ACETAMINOPHEN 5; 325 MG/1; MG/1
1 TABLET ORAL EVERY 4 HOURS PRN
Status: DISCONTINUED | OUTPATIENT
Start: 2022-11-16 | End: 2022-11-16 | Stop reason: HOSPADM

## 2022-11-16 RX ORDER — BUPIVACAINE HYDROCHLORIDE 2.5 MG/ML
INJECTION, SOLUTION EPIDURAL; INFILTRATION; INTRACAUDAL
Status: DISCONTINUED | OUTPATIENT
Start: 2022-11-16 | End: 2022-11-16 | Stop reason: HOSPADM

## 2022-11-16 RX ORDER — TRIAMCINOLONE ACETONIDE 40 MG/ML
INJECTION, SUSPENSION INTRA-ARTICULAR; INTRAMUSCULAR
Status: DISCONTINUED | OUTPATIENT
Start: 2022-11-16 | End: 2022-11-16 | Stop reason: HOSPADM

## 2022-11-16 RX ORDER — SODIUM CHLORIDE, SODIUM GLUCONATE, SODIUM ACETATE, POTASSIUM CHLORIDE AND MAGNESIUM CHLORIDE 30; 37; 368; 526; 502 MG/100ML; MG/100ML; MG/100ML; MG/100ML; MG/100ML
INJECTION, SOLUTION INTRAVENOUS CONTINUOUS
Status: CANCELLED | OUTPATIENT
Start: 2022-11-16 | End: 2022-12-16

## 2022-11-16 RX ORDER — DIPHENHYDRAMINE HYDROCHLORIDE 50 MG/ML
25 INJECTION INTRAMUSCULAR; INTRAVENOUS ONCE
Status: CANCELLED | OUTPATIENT
Start: 2022-11-16 | End: 2022-11-16

## 2022-11-16 RX ORDER — SODIUM CHLORIDE, SODIUM GLUCONATE, SODIUM ACETATE, POTASSIUM CHLORIDE AND MAGNESIUM CHLORIDE 30; 37; 368; 526; 502 MG/100ML; MG/100ML; MG/100ML; MG/100ML; MG/100ML
INJECTION, SOLUTION INTRAVENOUS CONTINUOUS
Status: DISCONTINUED | OUTPATIENT
Start: 2022-11-16 | End: 2022-11-16 | Stop reason: HOSPADM

## 2022-11-16 RX ORDER — ACETAMINOPHEN 325 MG/1
650 TABLET ORAL EVERY 4 HOURS PRN
Status: DISCONTINUED | OUTPATIENT
Start: 2022-11-16 | End: 2022-11-16 | Stop reason: HOSPADM

## 2022-11-16 RX ORDER — ONDANSETRON 4 MG/1
4 TABLET, ORALLY DISINTEGRATING ORAL ONCE
Status: DISCONTINUED | OUTPATIENT
Start: 2022-11-16 | End: 2022-11-16 | Stop reason: HOSPADM

## 2022-11-16 RX ORDER — LIDOCAINE HYDROCHLORIDE 10 MG/ML
INJECTION, SOLUTION EPIDURAL; INFILTRATION; INTRACAUDAL; PERINEURAL
Status: DISCONTINUED
Start: 2022-11-16 | End: 2022-11-16 | Stop reason: HOSPADM

## 2022-11-16 RX ORDER — LIDOCAINE HYDROCHLORIDE 20 MG/ML
INJECTION, SOLUTION EPIDURAL; INFILTRATION; INTRACAUDAL; PERINEURAL
Status: DISCONTINUED | OUTPATIENT
Start: 2022-11-16 | End: 2022-11-16

## 2022-11-16 RX ORDER — PROPOFOL 10 MG/ML
VIAL (ML) INTRAVENOUS
Status: DISCONTINUED | OUTPATIENT
Start: 2022-11-16 | End: 2022-11-16

## 2022-11-16 RX ORDER — BUPIVACAINE HYDROCHLORIDE 2.5 MG/ML
INJECTION, SOLUTION EPIDURAL; INFILTRATION; INTRACAUDAL
Status: DISCONTINUED
Start: 2022-11-16 | End: 2022-11-16 | Stop reason: HOSPADM

## 2022-11-16 RX ADMIN — LIDOCAINE HYDROCHLORIDE 50 MG: 20 INJECTION, SOLUTION EPIDURAL; INFILTRATION; INTRACAUDAL; PERINEURAL at 11:11

## 2022-11-16 RX ADMIN — PROPOFOL 50 MG: 10 INJECTION, EMULSION INTRAVENOUS at 12:11

## 2022-11-16 RX ADMIN — PROPOFOL 50 MG: 10 INJECTION, EMULSION INTRAVENOUS at 11:11

## 2022-11-16 NOTE — ANESTHESIA POSTPROCEDURE EVALUATION
Anesthesia Post Evaluation    Patient: Marlon Franco    Procedure(s) Performed: Procedure(s) (LRB):  INJECTION,SACROILIAC JOINT (Right)    Final Anesthesia Type: general      Patient location during evaluation: PACU  Patient participation: Yes- Able to Participate  Level of consciousness: awake and alert and oriented  Post-procedure vital signs: reviewed and stable  Pain management: adequate  Airway patency: patent  JE mitigation strategies: Verification of full reversal of neuromuscular block  PONV status at discharge: No PONV  Anesthetic complications: no      Cardiovascular status: blood pressure returned to baseline and stable  Respiratory status: spontaneous ventilation and unassisted  Hydration status: euvolemic  Follow-up not needed.  Comments: [facility]          Vitals Value Taken Time   /96 11/16/22 1235     11/16/22 1529   Pulse 71 11/16/22 1235   Resp 18 11/16/22 1235   SpO2 100 % 11/16/22 1235         No case tracking events are documented in the log.      Pain/Amos Score: Modified Amos Score: 20 (11/16/2022 12:35 PM)

## 2022-11-16 NOTE — TRANSFER OF CARE
Anesthesia Transfer of Care Note    Patient: Marlon Franco    Procedure(s) Performed: Procedure(s) (LRB):  INJECTION,SACROILIAC JOINT (Right)    Patient location: OPS    Anesthesia Type: MAC    Transport from OR: Transported from OR on room air with adequate spontaneous ventilation    Post pain: adequate analgesia    Post assessment: no apparent anesthetic complications    Post vital signs: stable    Level of consciousness: awake    Complications: none    Transfer of care protocol was followed      Last vitals:   Visit Vitals  BP (!) 140/99   Pulse 77   Temp 36.9 °C (98.4 °F) (Tympanic)   Resp 20   Ht 6' (1.829 m)   Wt 100.2 kg (220 lb 14.4 oz)   SpO2 99%   BMI 29.96 kg/m²

## 2022-11-16 NOTE — DISCHARGE SUMMARY
[facility] - Periop Services  Discharge Note  Short Stay    Procedure(s) (LRB):  INJECTION,SACROILIAC JOINT (Right)      OUTCOME: Patient tolerated treatment/procedure well without complication and is now ready for discharge.    DISPOSITION: Home or Self Care    FINAL DIAGNOSIS:  <principal problem not specified>    FOLLOWUP: In clinic    DISCHARGE INSTRUCTIONS:  No discharge procedures on file.     TIME SPENT ON DISCHARGE: 5 minutes

## 2022-11-16 NOTE — PLAN OF CARE
Preparing patient for discharge. Vital signs stable. Patient and family verbalize understanding of discharge instructions

## 2022-11-16 NOTE — ANESTHESIA PREPROCEDURE EVALUATION
11/16/2022  Marlon Franco is a 47 y.o., male.       Pre-op Assessment    I have reviewed the Patient Summary Reports.     I have reviewed the Nursing Notes. I have reviewed the NPO Status.   I have reviewed the Medications.     Review of Systems  Anesthesia Hx:  No problems with previous Anesthesia    Hematology/Oncology:  Hematology Normal   Oncology Normal     EENT/Dental:EENT/Dental Normal   Cardiovascular:  Cardiovascular Normal Exercise tolerance: good   Functional Capacity good / => 4 METS    Pulmonary:  Pulmonary Normal    Renal/:   Denies Chronic Renal Disease.     Hepatic/GI:  Hepatic/GI Normal    Musculoskeletal:  Musculoskeletal Normal    Neurological:  Neurology Normal    Endocrine:  Endocrine Normal  Denies Morbid Obesity / BMI > 40  Dermatological:  Skin Normal    Psych:  Psychiatric Normal           Physical Exam  General: Alert, Oriented, Well nourished and Cooperative    Airway:  Mallampati: II   Mouth Opening: Normal  TM Distance: Normal  Tongue: Normal  Neck ROM: Normal ROM    Dental:  Intact    Chest/Lungs:  Clear to auscultation, Normal Respiratory Rate    Heart:  Rate: Normal  Rhythm: Regular Rhythm        Anesthesia Plan  Type of Anesthesia, risks & benefits discussed:    Anesthesia Type: Gen Natural Airway  ASA Score: 1    Ready For Surgery From Anesthesia Perspective.     .

## 2022-11-16 NOTE — OP NOTE
Right Sacroiliac Joint Injection    Pre-Procedure Diagnoses:  1. Chronic pain syndrome  2. Right SI joint pain  3. Right sacroiliitis  4. Low back pain    Post-Procedure Diagnoses:  1. Chronic pain syndrome  2. Right SI joint pain  3. Right sacroiliitis  4. Low back pain    Anesthesia:  Local and MAC    Estimated Blood Loss:  None    Complications:  None    Informed Consent:  The procedure, risks, benefits, and alternatives were discussed with the patient. There were no contraindications to the procedure. The patient expressed understanding and agreed to proceed. Fully informed written consent was obtained.     Description of the Procedure:  The patient was taken to the operating room. IV access was obtained prior to the start of the procedure. The patient was positioned prone on the fluoroscopy table. Continuous hemodynamic monitoring was initiated and continued throughout the duration of the procedure. The skin overlying the lumbosacral spine was prepped with Chloraprep and draped into a sterile field. Fluoroscopy was used to identify the location of the right sacroiliac joint. Skin anesthesia was achieved using 1 mL of 1% lidocaine over the injection site. A 22 gauge 3.5 inch Quinke spinal needle was slowly inserted and advanced under intermittent fluoroscopy through the SI joint capsule. Proper needle position was confirmed under AP, oblique, and lateral fluoroscopic views. Negative aspiration was confirmed. Then a combination of 40 mg of Kenalog and 1 mL of 0.25% bupivacaine was easily injected. There was no pain on injection. The needle was removed intact and bleeding was nil.  Sterile bandages were applied. The patient was taken to the recovery room for further observation in stable condition. The patient was then discharged home without any complications.

## 2022-11-17 NOTE — ANESTHESIA POSTPROCEDURE EVALUATION
Anesthesia Post Evaluation    Patient: Marlon Franco    Procedure(s) Performed: Procedure(s) (LRB):  INJECTION,SACROILIAC JOINT (Right)    [facility] Anesthesia Post Op Evaluation      Vitals Value Taken Time   /96 11/16/22 1235     11/17/22 0706   Pulse 71 11/16/22 1235   Resp 18 11/16/22 1235   SpO2 100 % 11/16/22 1235         No case tracking events are documented in the log.      Pain/Amos Score: Modified Amos Score: 20 (11/16/2022 12:35 PM)

## 2022-11-29 NOTE — PROGRESS NOTES
Subjective:      Patient ID: Marlon Franco is a 47 y.o. male.    Chief Complaint: Post-op Evaluation (Post op injections, SIJ, on 11/16/22. Patient states that the injections did help. States very  little pain at this time. )    Referred by: Lyndsay Aguayo He*     HPI :  The patient presents as a follow-up postop SI joint injection on 11/16/2022.  Patient is new to me.: however, he is an established patient of Dr. Brooks.He received notable pain relief. Prior to SI joint injection, he could not roll over in bed. This has resolved. Pain worse with sitting on commode too long. He is pleased with his pain control.      He has pertinent past medical history of bilateral carpal tunnel syndrome, essential tremor, GERD, hypertension, low back pain, lumbar radiculitis, lumbar spinal stenosis, piriformis syndrome, PTSD, sacroiliitis and sleep apnea.  Past surgical history includes left ankle surgery, left arm fracture, left bunionectomy, ORIF left arm,    Interventional Pain History  11/16/2022: SI joint injection   10/12/2022:  Cervical interlaminar epidural steroid C7-T1    ROS  As noted in HPI      Objective:          Physical Exam  Spine Musculoskeletal Exam     Gait    Gait is normal.     Inspection    Cervical Spine    Cervical spine inspection is normal.     Palpation    Thoracolumbar    Tenderness: present      Piriformis: right      SI Joint: right     Range of Motion    Cervical Spine        Cervical spine range of motion additional comments: Restricted ROM in neck due to pain.     Strength    Cervical Spine    Cervical spine motor exam is normal.     Sensory    Cervical Spine    Cervical spine sensation is normal.     Special Tests    Thoracolumbar      Right      DENA test: positive (Left)     General      Constitutional: appears stated age, well-developed and well-nourished    Scleral icterus: no    Labored breathing: no    Psychiatric: normal mood and affect and no acute distress    Neurological:  alert and oriented x3    Skin: intact    Lymphadenopathy: none          Assessment:       Encounter Diagnosis   Name Primary?    Sacroiliitis Yes         Plan:       Marlon was seen today for post-op evaluation.    Diagnoses and all orders for this visit:    Sacroiliitis     Follow up with Dr. Brooks in 1 month to evaluate SI Joint and cervicalgia.

## 2022-11-30 ENCOUNTER — OFFICE VISIT (OUTPATIENT)
Dept: ORTHOPEDICS | Facility: CLINIC | Age: 47
End: 2022-11-30
Payer: OTHER GOVERNMENT

## 2022-11-30 VITALS
SYSTOLIC BLOOD PRESSURE: 164 MMHG | HEIGHT: 72 IN | BODY MASS INDEX: 29.8 KG/M2 | DIASTOLIC BLOOD PRESSURE: 114 MMHG | WEIGHT: 220 LBS | HEART RATE: 83 BPM

## 2022-11-30 DIAGNOSIS — M46.1 SACROILIITIS: Primary | ICD-10-CM

## 2022-11-30 PROCEDURE — 99214 OFFICE O/P EST MOD 30 MIN: CPT | Mod: ,,, | Performed by: NURSE PRACTITIONER

## 2022-11-30 PROCEDURE — 99214 PR OFFICE/OUTPT VISIT, EST, LEVL IV, 30-39 MIN: ICD-10-PCS | Mod: ,,, | Performed by: NURSE PRACTITIONER

## 2023-01-06 ENCOUNTER — OFFICE VISIT (OUTPATIENT)
Dept: PAIN MEDICINE | Facility: CLINIC | Age: 48
End: 2023-01-06
Payer: OTHER GOVERNMENT

## 2023-01-06 VITALS
SYSTOLIC BLOOD PRESSURE: 130 MMHG | HEART RATE: 90 BPM | DIASTOLIC BLOOD PRESSURE: 100 MMHG | HEIGHT: 72 IN | WEIGHT: 220 LBS | BODY MASS INDEX: 29.8 KG/M2

## 2023-01-06 DIAGNOSIS — G89.29 CHRONIC BACK PAIN GREATER THAN 3 MONTHS DURATION: ICD-10-CM

## 2023-01-06 DIAGNOSIS — M54.9 CHRONIC BACK PAIN GREATER THAN 3 MONTHS DURATION: ICD-10-CM

## 2023-01-06 DIAGNOSIS — M54.2 CERVICALGIA: ICD-10-CM

## 2023-01-06 DIAGNOSIS — M50.30 DDD (DEGENERATIVE DISC DISEASE), CERVICAL: Primary | ICD-10-CM

## 2023-01-06 DIAGNOSIS — M51.36 LUMBAR DEGENERATIVE DISC DISEASE: ICD-10-CM

## 2023-01-06 DIAGNOSIS — M46.1 SACROILIITIS: ICD-10-CM

## 2023-01-06 PROCEDURE — 99213 PR OFFICE/OUTPT VISIT, EST, LEVL III, 20-29 MIN: ICD-10-PCS | Mod: ,,, | Performed by: ANESTHESIOLOGY

## 2023-01-06 PROCEDURE — 99213 OFFICE O/P EST LOW 20 MIN: CPT | Mod: ,,, | Performed by: ANESTHESIOLOGY

## 2023-01-06 RX ORDER — METHOCARBAMOL 750 MG/1
750 TABLET, FILM COATED ORAL 3 TIMES DAILY PRN
Qty: 50 TABLET | Refills: 3 | Status: SHIPPED | OUTPATIENT
Start: 2023-01-06

## 2023-01-06 RX ORDER — NYSTATIN 100000 [USP'U]/ML
SUSPENSION ORAL
COMMUNITY
Start: 2022-12-12

## 2023-01-06 RX ORDER — RIFAXIMIN 550 MG/1
550 TABLET ORAL 3 TIMES DAILY
COMMUNITY
Start: 2022-07-18

## 2023-01-06 RX ORDER — BUPROPION HYDROCHLORIDE 150 MG/1
1 TABLET ORAL DAILY
COMMUNITY
Start: 2022-11-28 | End: 2024-03-11

## 2023-01-06 NOTE — PROGRESS NOTES
Beth Brooks MD        PATIENT NAME: Marlon Franco  : 1975  DATE: 23  MRN: [id number]      Billing Provider: Beth Brooks MD  Level of Service:   Patient PCP Information       Provider PCP Type    Primary Doctor No General            Reason for Visit / Chief Complaint: Neck Pain (Follow up visit for neck pain. Pain is a 4/10 on worse day. Taking prescription medications for pain. States he has tried exercise and PT for the pain but it did not help./)       Update PCP  Update Chief Complaint         History of Present Illness / Problem Focused Workflow     Marlon Franco presents to the clinic with Neck Pain (Follow up visit for neck pain. Pain is a 4/10 on worse day. Taking prescription medications for pain. States he has tried exercise and PT for the pain but it did not help./)       Here for f/u of LBP and also c/o neck pain radiating down the left arm to his elbow, as well as low back pain.  Currently rates his pain as 4/10 on the NRS, but it gets up to 8.5/10 at worst.  He had to use his 10s unit for 2 hours.  He takes daily over-the-counter pain relievers, which help a little sometimes, but other times they do not help at all.  He saw a neurologist for an EMG and should be following up with him.      Back Pain  Associated symptoms include numbness.   Neck Pain   Associated symptoms include numbness.     Review of Systems     Review of Systems   Musculoskeletal:  Positive for back pain, neck pain and neck stiffness.   Neurological:  Positive for dizziness and numbness.   All other systems reviewed and are negative.     Medical / Social / Family History     Past Medical History:   Diagnosis Date    Carpal tunnel syndrome of left wrist     Carpal tunnel syndrome of right wrist     Essential tremor     GERD (gastroesophageal reflux disease)     HTN (hypertension)     Low back pain     Lumbar radiculitis     Lumbar spinal stenosis     Piriformis syndrome     PTSD (post-traumatic stress  disorder)     Sacroiliitis     Sleep apnea, unspecified        Past Surgical History:   Procedure Laterality Date    ANKLE SURGERY Left     BUNIONECTOMY Left     EPIDURAL STEROID INJECTION INTO CERVICAL SPINE N/A 10/12/2022    Procedure: Injection-steroid-epidural-cervical;  Surgeon: Beth Brooks MD;  Location: [facility] OR;  Service: Pain Management;  Laterality: N/A;  C7-T1    FRACTURE SURGERY  1981    Broke left arm    INJECTION OF PIRIFORMIS MUSCLE Left 02/16/2022    INJECTION, SACROILIAC JOINT Left 02/16/2022    INJECTION, SACROILIAC JOINT Right 11/16/2022    Procedure: INJECTION,SACROILIAC JOINT;  Surgeon: Beth Brooks MD;  Location: [facility] OR;  Service: Pain Management;  Laterality: Right;  Right SIJ    ORIF LEFT ARM      REPAIR OF SCROTUM      SURGICAL REMOVAL OF PILONIDAL CYST      TRANSFORAMINAL EPIDURAL INJECTION OF STEROID Left 02/24/2021    L3 & L4    TRIGGER POINT INJECTION  10/04/2021       Social History  Mr. Franco  reports that he has never smoked. He has never used smokeless tobacco. He reports that he does not currently use alcohol. He reports that he does not use drugs.    Family History  Mr.'s Franco family history includes Arthritis in his maternal aunt; Asthma in his father; Cancer in his mother; Heart disease in his father.    Medications and Allergies     Medications  Outpatient Medications Marked as Taking for the 1/6/23 encounter (Office Visit) with Beth Brooks MD   Medication Sig Dispense Refill    adalimumab (HUMIRA,CF,) 40 mg/0.4 mL SyKt Inject 40 mg into the skin every 14 (fourteen) days.      allopurinoL (ZYLOPRIM) 100 MG tablet Take 100 mg by mouth once daily.      alprostadiL (EDEX) 20 mcg injection INJECT 20MCG/1VIL INTRACAVITY EVERY DAY AS NEEDED  FOR THE TREATMENT OF ERECTILE DYSFUNCTION *MAX OF 6 PER 90 DAYS SUPPLY*      buPROPion (WELLBUTRIN XL) 150 MG TB24 tablet Take 1 tablet by mouth once daily.      busPIRone (BUSPAR) 15 MG tablet Take 15 mg by mouth 3 (three) times  daily.      carBAMazepine (TEGRETOL) 200 mg tablet Take 200 mg by mouth 2 (two) times daily.      citalopram (CELEXA) 40 MG tablet Take 40 mg by mouth once daily.      clonazePAM (KLONOPIN) 1 MG tablet Take 1 mg by mouth 3 (three) times daily as needed.      cyanocobalamin (VITAMIN B-12) 1000 MCG tablet Take 1 tablet by mouth once daily.      dicyclomine (BENTYL) 20 mg tablet Take 20 mg by mouth once daily.      divalproex (DEPAKOTE) 500 MG TbEC Take 250 mg by mouth once daily.      flecainide (TAMBOCOR) 50 MG Tab Take 50 mg by mouth 2 (two) times daily.      fluticasone propionate (FLONASE) 50 mcg/actuation nasal spray 1 spray by Each Nostril route as needed.      gabapentin (NEURONTIN) 300 MG capsule Take 300 mg by mouth 3 (three) times daily.      HYDROcodone-acetaminophen (NORCO)  mg per tablet Take 1 tablet by mouth every 4 (four) hours as needed.      HYDROcodone-acetaminophen  mg/15 mL Soln Take 1 tablet by mouth every 6 (six) hours as needed.      hydrOXYzine (ATARAX) 50 MG tablet Take 100 mg by mouth nightly.      LIDOcaine (LIDODERM) 5 % Place 1 patch onto the skin every 24 hours. Only prn      meloxicam (MOBIC) 7.5 MG tablet Take 7.5 mg by mouth once daily.      midodrine (PROAMATINE) 5 MG Tab Take 5 mg by mouth Daily.      mirtazapine (REMERON) 30 MG tablet Take 30 mg by mouth every evening.      naloxone (NARCAN) 4 mg/actuation Spry USE 1 SPRAY IN ONE NOSTRIL ONCE AS DIRECTED FOR OPIATE REVERSAL  FOR OPIOID OVERDOSE. DO NOT PRIME NASAL SPRAY. GIVE ADDITIONAL DOSE IF  PATIENT DOES NOT RESPOND WITHIN 2-3 MINUTES OR RESPONDS BUT STOPS  BREATHING AGAIN.  CALL 911.  IF USED, NOTIFY YOUR PROVIDER FOR OPIATE REVERSAL  FOR OPIOID OVERDOSE. DO NOT PRIME NASAL SPRAY. GIVE ADDITIONAL DOSE IF  PATIENT DOES NOT RESPOND WITHIN 2-3 MINUTES OR RESPONDS BUT STOPS  BREATHING AGAIN.  CALL 911.  IF USED, NOTIFY YOUR PROVIDER      nystatin (MYCOSTATIN) 100,000 unit/mL suspension TAKE 5 ML BY MOUTH FOUR TIMES A  DAY (SWISH AND SWALLOW)      pantoprazole (PROTONIX) 40 MG tablet Take 40 mg by mouth once daily.      pravastatin (PRAVACHOL) 20 MG tablet Take 20 mg by mouth every evening.      prazosin (MINIPRESS) 2 MG Cap Take 4 mg by mouth every evening.      primidone (MYSOLINE) 50 MG Tab Take 50 mg by mouth 2 (two) times a day.      ramelteon (ROZEREM) 8 mg tablet Take 8 mg by mouth every evening. prn      senna (SENOKOT) 8.6 mg tablet Take 17.2 mg by mouth once daily. prn      tamsulosin (FLOMAX) 0.4 mg Cap 0.4 mg once daily.      testosterone cypionate (DEPOTESTOTERONE CYPIONATE) 200 mg/mL injection 200 mg every 14 (fourteen) days.      testosterone enanthate (DELATESTRYL) 200 mg/mL injection Inject 200 mg into the muscle every 14 (fourteen) days.      topiramate (TOPAMAX) 200 MG Tab Take 200 mg by mouth.      traZODone (DESYREL) 100 MG tablet Take 100 mg by mouth every evening.      venlafaxine (EFFEXOR) 75 MG tablet Take 75 mg by mouth 2 (two) times daily.      verapamiL (CALAN) 120 MG tablet Take 120 mg by mouth nightly.      XIFAXAN 550 mg Tab Take 550 mg by mouth 3 (three) times daily.      [DISCONTINUED] cyclobenzaprine (FLEXERIL) 10 MG tablet Take 10 mg by mouth 3 (three) times daily as needed.         Allergies  Review of patient's allergies indicates:  No Known Allergies    Physical Examination     Vitals:    01/06/23 0939   BP: (!) 130/100   Pulse: 90     Spine Musculoskeletal Exam    Gait    Gait is normal.    Inspection    Cervical Spine    Cervical spine inspection is normal.    Palpation    Thoracolumbar    Tenderness: present      Piriformis: right      SI Joint: right    Range of Motion    Cervical Spine        Cervical spine range of motion additional comments: Restricted ROM in neck due to pain.    Strength    Cervical Spine    Cervical spine motor exam is normal.    Sensory    Cervical Spine    Cervical spine sensation is normal.    Special Tests    Thoracolumbar      Right      DENA test:  positive    General      Constitutional: appears stated age, well-developed and well-nourished    Scleral icterus: no    Labored breathing: no    Psychiatric: normal mood and affect and no acute distress    Neurological: alert and oriented x3    Skin: intact    Lymphadenopathy: none     Assessment and Plan (including Health Maintenance)      Problem List  Smart Sets  Document Outside HM   :    Plan:   DDD (degenerative disc disease), cervical  -     methocarbamoL (ROBAXIN) 750 MG Tab; Take 1 tablet (750 mg total) by mouth 3 (three) times daily as needed (muscle spasms).  Dispense: 50 tablet; Refill: 3    Cervicalgia  -     methocarbamoL (ROBAXIN) 750 MG Tab; Take 1 tablet (750 mg total) by mouth 3 (three) times daily as needed (muscle spasms).  Dispense: 50 tablet; Refill: 3    Sacroiliitis  -     methocarbamoL (ROBAXIN) 750 MG Tab; Take 1 tablet (750 mg total) by mouth 3 (three) times daily as needed (muscle spasms).  Dispense: 50 tablet; Refill: 3    Chronic back pain greater than 3 months duration  -     methocarbamoL (ROBAXIN) 750 MG Tab; Take 1 tablet (750 mg total) by mouth 3 (three) times daily as needed (muscle spasms).  Dispense: 50 tablet; Refill: 3    Lumbar degenerative disc disease  -     methocarbamoL (ROBAXIN) 750 MG Tab; Take 1 tablet (750 mg total) by mouth 3 (three) times daily as needed (muscle spasms).  Dispense: 50 tablet; Refill: 3       He complains of worsening muscle spasms and states that Flexeril is not helping.  I will switch him to Robaxin today and plan to follow up again in 3-4 months.      Problem List Items Addressed This Visit       Chronic back pain greater than 3 months duration    Relevant Medications    methocarbamoL (ROBAXIN) 750 MG Tab    Sacroiliitis    Relevant Medications    methocarbamoL (ROBAXIN) 750 MG Tab    Lumbar degenerative disc disease    Relevant Medications    methocarbamoL (ROBAXIN) 750 MG Tab    Cervicalgia    Relevant Medications    methocarbamoL (ROBAXIN) 750  MG Tab    DDD (degenerative disc disease), cervical - Primary    Relevant Medications    methocarbamoL (ROBAXIN) 750 MG Tab         Future Appointments   Date Time Provider Department Center   5/8/2023 10:00 AM Beth Brooks MD [facility]          There are no Patient Instructions on file for this visit.  No follow-ups on file.     Signature:  Beth Brooks MD      Date of encounter: 1/6/23

## 2023-05-23 ENCOUNTER — OFFICE VISIT (OUTPATIENT)
Dept: PAIN MEDICINE | Facility: CLINIC | Age: 48
End: 2023-05-23
Payer: OTHER GOVERNMENT

## 2023-05-23 VITALS
TEMPERATURE: 99 F | HEART RATE: 73 BPM | WEIGHT: 223 LBS | SYSTOLIC BLOOD PRESSURE: 137 MMHG | HEIGHT: 72 IN | BODY MASS INDEX: 30.2 KG/M2 | DIASTOLIC BLOOD PRESSURE: 100 MMHG

## 2023-05-23 DIAGNOSIS — M46.1 SACROILIITIS: ICD-10-CM

## 2023-05-23 DIAGNOSIS — G89.29 CHRONIC BACK PAIN GREATER THAN 3 MONTHS DURATION: ICD-10-CM

## 2023-05-23 DIAGNOSIS — M54.9 CHRONIC BACK PAIN GREATER THAN 3 MONTHS DURATION: ICD-10-CM

## 2023-05-23 DIAGNOSIS — M51.36 LUMBAR DEGENERATIVE DISC DISEASE: Primary | ICD-10-CM

## 2023-05-23 PROCEDURE — 99213 OFFICE O/P EST LOW 20 MIN: CPT | Mod: ,,, | Performed by: ANESTHESIOLOGY

## 2023-05-23 PROCEDURE — 99213 PR OFFICE/OUTPT VISIT, EST, LEVL III, 20-29 MIN: ICD-10-PCS | Mod: ,,, | Performed by: ANESTHESIOLOGY

## 2023-05-23 NOTE — LETTER
May 23, 2023       [facility]  [address], [address]  [place] [address]  Phone: [phone]  Fax: [phone]       Patient: Marlon Franco   YOB: 1975  Date of Visit: 05/23/2023    To Whom It May Concern:    Nena Franco  was at [facility] on 05/23/2023.  If you have any questions or concerns, or if I can be of further assistance, please do not hesitate to contact me.    Sincerely,    Delia New LPN
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
## 2023-06-29 RX ORDER — HYDRALAZINE HYDROCHLORIDE 25 MG/1
1 TABLET, FILM COATED ORAL DAILY
COMMUNITY
Start: 2023-05-18

## 2023-07-10 ENCOUNTER — ANESTHESIA EVENT (OUTPATIENT)
Dept: SURGERY | Facility: HOSPITAL | Age: 48
End: 2023-07-10
Payer: OTHER GOVERNMENT

## 2023-07-12 ENCOUNTER — HOSPITAL ENCOUNTER (OUTPATIENT)
Facility: HOSPITAL | Age: 48
Discharge: HOME | End: 2023-07-12
Attending: ANESTHESIOLOGY | Admitting: ANESTHESIOLOGY
Payer: OTHER GOVERNMENT

## 2023-07-12 ENCOUNTER — ANESTHESIA (OUTPATIENT)
Dept: SURGERY | Facility: HOSPITAL | Age: 48
End: 2023-07-12
Payer: OTHER GOVERNMENT

## 2023-07-12 DIAGNOSIS — M46.1 SACROILIITIS: ICD-10-CM

## 2023-07-12 PROCEDURE — D9220A PRA ANESTHESIA: ICD-10-PCS | Mod: ANES,,, | Performed by: ANESTHESIOLOGY

## 2023-07-12 PROCEDURE — D9220A PRA ANESTHESIA: ICD-10-PCS | Mod: CRNA,,, | Performed by: STUDENT IN AN ORGANIZED HEALTH CARE EDUCATION/TRAINING PROGRAM

## 2023-07-12 PROCEDURE — 63600175 PHARM REV CODE 636 W HCPCS: Performed by: ANESTHESIOLOGY

## 2023-07-12 PROCEDURE — 27096 INJECT SACROILIAC JOINT: CPT | Performed by: ANESTHESIOLOGY

## 2023-07-12 PROCEDURE — 27096 INJECT SACROILIAC JOINT: CPT | Mod: LT,,, | Performed by: ANESTHESIOLOGY

## 2023-07-12 PROCEDURE — 27096 PR INJECTION,SACROILIAC JOINT: ICD-10-PCS | Mod: LT,,, | Performed by: ANESTHESIOLOGY

## 2023-07-12 PROCEDURE — D9220A PRA ANESTHESIA: Mod: CRNA,,, | Performed by: STUDENT IN AN ORGANIZED HEALTH CARE EDUCATION/TRAINING PROGRAM

## 2023-07-12 PROCEDURE — 63600175 PHARM REV CODE 636 W HCPCS: Performed by: STUDENT IN AN ORGANIZED HEALTH CARE EDUCATION/TRAINING PROGRAM

## 2023-07-12 PROCEDURE — 25000003 PHARM REV CODE 250: Performed by: ANESTHESIOLOGY

## 2023-07-12 PROCEDURE — 01160 ANES CLSD PX SYMPH PUB/SI JT: CPT | Performed by: ANESTHESIOLOGY

## 2023-07-12 PROCEDURE — 37000008 HC ANESTHESIA 1ST 15 MINUTES: Performed by: ANESTHESIOLOGY

## 2023-07-12 PROCEDURE — 25000003 PHARM REV CODE 250: Performed by: STUDENT IN AN ORGANIZED HEALTH CARE EDUCATION/TRAINING PROGRAM

## 2023-07-12 PROCEDURE — D9220A PRA ANESTHESIA: Mod: ANES,,, | Performed by: ANESTHESIOLOGY

## 2023-07-12 RX ORDER — TRIAMCINOLONE ACETONIDE 40 MG/ML
INJECTION, SUSPENSION INTRA-ARTICULAR; INTRAMUSCULAR
Status: DISCONTINUED
Start: 2023-07-12 | End: 2023-07-12 | Stop reason: HOSPADM

## 2023-07-12 RX ORDER — ONDANSETRON 2 MG/ML
4 INJECTION INTRAMUSCULAR; INTRAVENOUS DAILY PRN
Status: CANCELLED | OUTPATIENT
Start: 2023-07-12

## 2023-07-12 RX ORDER — BUPIVACAINE HYDROCHLORIDE 2.5 MG/ML
INJECTION, SOLUTION EPIDURAL; INFILTRATION; INTRACAUDAL
Status: DISCONTINUED | OUTPATIENT
Start: 2023-07-12 | End: 2023-07-12 | Stop reason: HOSPADM

## 2023-07-12 RX ORDER — ACETAMINOPHEN 10 MG/ML
1000 INJECTION, SOLUTION INTRAVENOUS ONCE
Status: CANCELLED | OUTPATIENT
Start: 2023-07-12 | End: 2023-07-12

## 2023-07-12 RX ORDER — BUPIVACAINE HYDROCHLORIDE 2.5 MG/ML
INJECTION, SOLUTION EPIDURAL; INFILTRATION; INTRACAUDAL
Status: DISCONTINUED
Start: 2023-07-12 | End: 2023-07-12 | Stop reason: HOSPADM

## 2023-07-12 RX ORDER — LIDOCAINE HYDROCHLORIDE 10 MG/ML
INJECTION, SOLUTION EPIDURAL; INFILTRATION; INTRACAUDAL; PERINEURAL
Status: DISCONTINUED | OUTPATIENT
Start: 2023-07-12 | End: 2023-07-12 | Stop reason: HOSPADM

## 2023-07-12 RX ORDER — LIDOCAINE HYDROCHLORIDE 10 MG/ML
INJECTION, SOLUTION EPIDURAL; INFILTRATION; INTRACAUDAL; PERINEURAL
Status: DISCONTINUED
Start: 2023-07-12 | End: 2023-07-12 | Stop reason: HOSPADM

## 2023-07-12 RX ORDER — SODIUM CHLORIDE, SODIUM GLUCONATE, SODIUM ACETATE, POTASSIUM CHLORIDE AND MAGNESIUM CHLORIDE 30; 37; 368; 526; 502 MG/100ML; MG/100ML; MG/100ML; MG/100ML; MG/100ML
INJECTION, SOLUTION INTRAVENOUS CONTINUOUS
Status: CANCELLED | OUTPATIENT
Start: 2023-07-12 | End: 2023-08-11

## 2023-07-12 RX ORDER — LIDOCAINE HYDROCHLORIDE 10 MG/ML
1 INJECTION, SOLUTION EPIDURAL; INFILTRATION; INTRACAUDAL; PERINEURAL ONCE
Status: CANCELLED | OUTPATIENT
Start: 2023-07-12 | End: 2023-07-12

## 2023-07-12 RX ORDER — DIPHENHYDRAMINE HYDROCHLORIDE 50 MG/ML
25 INJECTION INTRAMUSCULAR; INTRAVENOUS EVERY 6 HOURS PRN
Status: CANCELLED | OUTPATIENT
Start: 2023-07-12

## 2023-07-12 RX ORDER — TRIAMCINOLONE ACETONIDE 40 MG/ML
INJECTION, SUSPENSION INTRA-ARTICULAR; INTRAMUSCULAR
Status: DISCONTINUED | OUTPATIENT
Start: 2023-07-12 | End: 2023-07-12 | Stop reason: HOSPADM

## 2023-07-12 RX ORDER — LIDOCAINE HYDROCHLORIDE 20 MG/ML
INJECTION INTRAVENOUS
Status: DISCONTINUED | OUTPATIENT
Start: 2023-07-12 | End: 2023-07-12

## 2023-07-12 RX ORDER — PROPOFOL 10 MG/ML
VIAL (ML) INTRAVENOUS
Status: DISCONTINUED | OUTPATIENT
Start: 2023-07-12 | End: 2023-07-12

## 2023-07-12 RX ADMIN — PROPOFOL 20 MG: 10 INJECTION, EMULSION INTRAVENOUS at 10:07

## 2023-07-12 RX ADMIN — PROPOFOL 30 MG: 10 INJECTION, EMULSION INTRAVENOUS at 10:07

## 2023-07-12 RX ADMIN — LIDOCAINE HYDROCHLORIDE 50 MG: 20 INJECTION INTRAVENOUS at 10:07

## 2023-07-12 RX ADMIN — PROPOFOL 50 MG: 10 INJECTION, EMULSION INTRAVENOUS at 10:07

## 2023-07-12 NOTE — TRANSFER OF CARE
Anesthesia Transfer of Care Note    Patient: Marlon Franco    Procedure(s) Performed: Procedure(s) (LRB):  INJECTION,SACROILIAC JOINT (Left)    Patient location: OPS    Anesthesia Type: MAC    Transport from OR: Transported from OR on room air with adequate spontaneous ventilation    Post pain: adequate analgesia    Post vital signs: stable    Level of consciousness: awake and alert    Nausea/Vomiting: no nausea/vomiting    Complications: none    Transfer of care protocol was followed      Last vitals:   Visit Vitals  BP (!) 105/105 (BP Location: Left arm, Patient Position: Lying)   Pulse 70   Temp 36.7 °C (98 °F) (Tympanic)   Resp 20   Ht 6' (1.829 m)   Wt 104.1 kg (229 lb 8 oz)   SpO2 97%   BMI 31.13 kg/m²

## 2023-07-12 NOTE — DISCHARGE SUMMARY
[facility] - Periop Services  Discharge Note  Short Stay    Procedure(s) (LRB):  INJECTION,SACROILIAC JOINT (Left)      OUTCOME: Patient tolerated treatment/procedure well without complication and is now ready for discharge.    DISPOSITION: Home or Self Care    FINAL DIAGNOSIS:  <principal problem not specified>    FOLLOWUP: In clinic    DISCHARGE INSTRUCTIONS:  No discharge procedures on file.     TIME SPENT ON DISCHARGE: 5 minutes

## 2023-07-12 NOTE — ANESTHESIA POSTPROCEDURE EVALUATION
Anesthesia Post Evaluation    Patient: Marlon Franco    Procedure(s) Performed: Procedure(s) (LRB):  INJECTION,SACROILIAC JOINT (Left)    Final Anesthesia Type: general      Patient location during evaluation: PACU  Patient participation: Yes- Able to Participate  Level of consciousness: awake and alert  Post-procedure vital signs: reviewed and stable  Pain management: adequate  Airway patency: patent      Anesthetic complications: no      Cardiovascular status: blood pressure returned to baseline  Respiratory status: unassisted  Hydration status: euvolemic  Follow-up not needed.          Vitals Value Taken Time   /115 07/12/23 1101   Temp ** 07/12/23 1340   Pulse 68 07/12/23 1101   Resp 18 07/12/23 1031   SpO2 94 % 07/12/23 1101   Vitals shown include unvalidated device data.      No case tracking events are documented in the log.      Pain/Amos Score: Modified Amos Score: 20 (7/12/2023 11:07 AM)
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
## 2023-07-12 NOTE — OP NOTE
Left Sacroiliac Joint Injection    Pre-Procedure Diagnoses:  1. Chronic pain syndrome  2. Left SI joint pain  3. Left sacroiliitis  4. Low back pain    Post-Procedure Diagnoses:  1. Chronic pain syndrome  2. Left SI joint pain  3. Left sacroiliitis  4. Low back pain    Anesthesia:  Local and MAC    Estimated Blood Loss:  None    Complications:  None    Informed Consent:  The procedure, risks, benefits, and alternatives were discussed with the patient. There were no contraindications to the procedure. The patient expressed understanding and agreed to proceed. Fully informed written consent was obtained.     Description of the Procedure:  The patient was taken to the operating room. IV access was obtained prior to the start of the procedure. The patient was positioned prone on the fluoroscopy table. Continuous hemodynamic monitoring was initiated and continued throughout the duration of the procedure. The skin overlying the lumbosacral spine was prepped with Chloraprep and draped into a sterile field. Fluoroscopy was used to identify the location of the left SI joint. Skin anesthesia was achieved using 1 mL of 1% lidocaine over the injection site. A 22 gauge 3.5 inch Quinke spinal needle was slowly inserted and advanced under intermittent fluoroscopy through the SI joint capsule. Proper needle position was confirmed under AP, oblique, and lateral fluoroscopic views. Negative aspiration was confirmed. Then a combination of 40 mg of Kenalog and 1 mL of 0.25% bupivacaine was easily injected. There was no pain on injection. The needle was removed intact and bleeding was nil.  Sterile bandages were applied. The patient was taken to the recovery room for further observation in stable condition. The patient was then discharged home without any complications.
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
## 2023-07-13 VITALS
HEART RATE: 64 BPM | BODY MASS INDEX: 31.08 KG/M2 | SYSTOLIC BLOOD PRESSURE: 157 MMHG | TEMPERATURE: 98 F | DIASTOLIC BLOOD PRESSURE: 94 MMHG | WEIGHT: 229.5 LBS | HEIGHT: 72 IN | OXYGEN SATURATION: 97 % | RESPIRATION RATE: 18 BRPM

## 2023-07-25 ENCOUNTER — OFFICE VISIT (OUTPATIENT)
Dept: PAIN MEDICINE | Facility: CLINIC | Age: 48
End: 2023-07-25
Payer: OTHER GOVERNMENT

## 2023-07-25 VITALS
TEMPERATURE: 98 F | SYSTOLIC BLOOD PRESSURE: 138 MMHG | DIASTOLIC BLOOD PRESSURE: 100 MMHG | HEIGHT: 72 IN | WEIGHT: 229 LBS | HEART RATE: 86 BPM | BODY MASS INDEX: 31.02 KG/M2

## 2023-07-25 DIAGNOSIS — M46.1 SACROILIITIS: ICD-10-CM

## 2023-07-25 DIAGNOSIS — M51.36 LUMBAR DEGENERATIVE DISC DISEASE: Primary | ICD-10-CM

## 2023-07-25 DIAGNOSIS — M50.30 DDD (DEGENERATIVE DISC DISEASE), CERVICAL: ICD-10-CM

## 2023-07-25 DIAGNOSIS — G89.29 CHRONIC BACK PAIN GREATER THAN 3 MONTHS DURATION: ICD-10-CM

## 2023-07-25 DIAGNOSIS — M54.12 CERVICAL RADICULITIS: ICD-10-CM

## 2023-07-25 DIAGNOSIS — M54.9 CHRONIC BACK PAIN GREATER THAN 3 MONTHS DURATION: ICD-10-CM

## 2023-07-25 PROCEDURE — 99213 OFFICE O/P EST LOW 20 MIN: CPT | Mod: ,,, | Performed by: ANESTHESIOLOGY

## 2023-07-25 PROCEDURE — 99213 PR OFFICE/OUTPT VISIT, EST, LEVL III, 20-29 MIN: ICD-10-PCS | Mod: ,,, | Performed by: ANESTHESIOLOGY

## 2023-07-25 NOTE — PROGRESS NOTES
Beth Brooks MD        PATIENT NAME: Marlon Franco  : 1975  DATE: 23  MRN: [id number]      Billing Provider: Beth Brooks MD  Level of Service:   Patient PCP Information       Provider PCP Type    Primary Doctor No General            Reason for Visit / Chief Complaint: Tailbone Pain (Had procedure on 2023 Pain level 3/10  Taking OTC and RX which did help. )       Update PCP  Update Chief Complaint         History of Present Illness / Problem Focused Workflow     Marlon Franco presents to the clinic with Tailbone Pain (Had procedure on 2023 Pain level 3/10  Taking OTC and RX which did help. )       This is a 48-year-old male who presents to clinic today for follow up of his chronic neck pain and left-sided lower back pain.  He underwent a right sacroiliac joint injection a couple weeks ago.  He returns today and states that he is getting significant relief of his sacroiliac joint pain since then.  It took a couple of days to start kicking in, but he is now only having minimal pain.  He does still have neck pain, for which he saw Dr. Johnson, who apparently recommended a cervical spine surgery but he is not sure about doing this yet.  Overall, he rates his pain level is 3/10      Back Pain  Associated symptoms include numbness.   Neck Pain   Associated symptoms include numbness.     Review of Systems     Review of Systems   Musculoskeletal:  Positive for back pain, neck pain and neck stiffness.   Neurological:  Positive for dizziness and numbness.   All other systems reviewed and are negative.     Medical / Social / Family History     Past Medical History:   Diagnosis Date    Carpal tunnel syndrome of left wrist     Carpal tunnel syndrome of right wrist     Essential tremor     GERD (gastroesophageal reflux disease)     HTN (hypertension)     Low back pain     Lumbar radiculitis     Lumbar spinal stenosis     Piriformis syndrome     PTSD (post-traumatic stress disorder)      Sacroiliitis     Sleep apnea, unspecified        Past Surgical History:   Procedure Laterality Date    ANKLE SURGERY Left     BUNIONECTOMY Left     EPIDURAL STEROID INJECTION INTO CERVICAL SPINE N/A 10/12/2022    Procedure: Injection-steroid-epidural-cervical;  Surgeon: Beth Brooks MD;  Location: [facility] OR;  Service: Pain Management;  Laterality: N/A;  C7-T1    FRACTURE SURGERY  1981    Broke left arm    INJECTION OF PIRIFORMIS MUSCLE Left 02/16/2022    INJECTION, SACROILIAC JOINT Left 02/16/2022    INJECTION, SACROILIAC JOINT Right 11/16/2022    Procedure: INJECTION,SACROILIAC JOINT;  Surgeon: Beth Brooks MD;  Location: [facility] OR;  Service: Pain Management;  Laterality: Right;  Right SIJ    INJECTION, SACROILIAC JOINT Left 07/12/2023    Procedure: INJECTION,SACROILIAC JOINT;  Surgeon: Beth Brooks MD;  Location: [facility] OR;  Service: Pain Management;  Laterality: Left;  Left SI Joint Injection    ORIF LEFT ARM      REPAIR OF SCROTUM      SURGICAL REMOVAL OF PILONIDAL CYST      TRANSFORAMINAL EPIDURAL INJECTION OF STEROID Left 02/24/2021    L3 & L4    TRIGGER POINT INJECTION  10/04/2021       Social History  Mr. Franco  reports that he has never smoked. He has never used smokeless tobacco. He reports that he does not currently use alcohol. He reports that he does not use drugs.    Family History  Mr.'s Franco family history includes Arthritis in his maternal aunt; Asthma in his father; Cancer in his mother; Heart disease in his father.    Medications and Allergies     Medications  Outpatient Medications Marked as Taking for the 7/25/23 encounter (Office Visit) with Beth Brooks MD   Medication Sig Dispense Refill    adalimumab (HUMIRA,CF,) 40 mg/0.4 mL SyKt Inject 40 mg into the skin every 14 (fourteen) days.      allopurinoL (ZYLOPRIM) 100 MG tablet Take 100 mg by mouth once daily.      alprostadiL (EDEX) 20 mcg injection INJECT 20MCG/1VIL INTRACAVITY EVERY DAY AS NEEDED  FOR THE TREATMENT OF ERECTILE  DYSFUNCTION *MAX OF 6 PER 90 DAYS SUPPLY*      buPROPion (WELLBUTRIN XL) 150 MG TB24 tablet Take 1 tablet by mouth once daily.      busPIRone (BUSPAR) 15 MG tablet Take 15 mg by mouth 2 (two) times daily.      carBAMazepine (TEGRETOL) 200 mg tablet Take 200 mg by mouth 2 (two) times daily.      citalopram (CELEXA) 40 MG tablet Take 40 mg by mouth once daily.      clonazePAM (KLONOPIN) 1 MG tablet Take 1 mg by mouth 3 (three) times daily as needed.      cyanocobalamin (VITAMIN B-12) 1000 MCG tablet Take 1 tablet by mouth once daily.      dicyclomine (BENTYL) 20 mg tablet Take 20 mg by mouth once daily.      divalproex (DEPAKOTE) 500 MG TbEC Take 500 mg by mouth nightly.      flecainide (TAMBOCOR) 50 MG Tab Take 50 mg by mouth 2 (two) times daily.      fluticasone propionate (FLONASE) 50 mcg/actuation nasal spray 1 spray by Each Nostril route as needed.      gabapentin (NEURONTIN) 400 MG capsule Take 400 mg by mouth 3 (three) times daily.      hydrALAZINE (APRESOLINE) 25 MG tablet Take 1 tablet by mouth once daily.      HYDROcodone-acetaminophen (NORCO)  mg per tablet Take 1 tablet by mouth every 6 (six) hours as needed.      HYDROcodone-acetaminophen  mg/15 mL Soln Take 1 tablet by mouth every 6 (six) hours as needed.      hydrOXYzine (ATARAX) 50 MG tablet Take 100 mg by mouth nightly.      LIDOcaine (LIDODERM) 5 % Place 1 patch onto the skin every 24 hours. Only prn      meloxicam (MOBIC) 7.5 MG tablet Take 7.5 mg by mouth once daily.      methocarbamoL (ROBAXIN) 750 MG Tab Take 1 tablet (750 mg total) by mouth 3 (three) times daily as needed (muscle spasms). (Patient taking differently: Take 500 mg by mouth 3 (three) times daily as needed (muscle spasms).) 50 tablet 3    midodrine (PROAMATINE) 5 MG Tab Take 5 mg by mouth Daily.      mirtazapine (REMERON) 45 MG tablet Take 45 mg by mouth every evening.      naloxone (NARCAN) 4 mg/actuation Spry USE 1 SPRAY IN ONE NOSTRIL ONCE AS DIRECTED FOR OPIATE  REVERSAL  FOR OPIOID OVERDOSE. DO NOT PRIME NASAL SPRAY. GIVE ADDITIONAL DOSE IF  PATIENT DOES NOT RESPOND WITHIN 2-3 MINUTES OR RESPONDS BUT STOPS  BREATHING AGAIN.  CALL 911.  IF USED, NOTIFY YOUR PROVIDER FOR OPIATE REVERSAL  FOR OPIOID OVERDOSE. DO NOT PRIME NASAL SPRAY. GIVE ADDITIONAL DOSE IF  PATIENT DOES NOT RESPOND WITHIN 2-3 MINUTES OR RESPONDS BUT STOPS  BREATHING AGAIN.  CALL 911.  IF USED, NOTIFY YOUR PROVIDER      nystatin (MYCOSTATIN) 100,000 unit/mL suspension TAKE 5 ML BY MOUTH FOUR TIMES A DAY (SWISH AND SWALLOW)      pantoprazole (PROTONIX) 40 MG tablet Take 40 mg by mouth once daily.      pravastatin (PRAVACHOL) 20 MG tablet Take 20 mg by mouth every evening.      prazosin (MINIPRESS) 2 MG Cap Take 4 mg by mouth every evening.      primidone (MYSOLINE) 50 MG Tab Take 50 mg by mouth 2 (two) times a day.      ramelteon (ROZEREM) 8 mg tablet Take 8 mg by mouth every evening. prn      senna (SENOKOT) 8.6 mg tablet Take 17.2 mg by mouth once daily. prn      tamsulosin (FLOMAX) 0.4 mg Cap 0.4 mg once daily.      testosterone cypionate (DEPOTESTOTERONE CYPIONATE) 200 mg/mL injection 200 mg every 14 (fourteen) days.      testosterone enanthate (DELATESTRYL) 200 mg/mL injection Inject 200 mg into the muscle every 14 (fourteen) days.      topiramate (TOPAMAX) 200 MG Tab Take 200 mg by mouth.      traZODone (DESYREL) 100 MG tablet Take 100 mg by mouth every evening.      venlafaxine (EFFEXOR) 75 MG tablet Take 75 mg by mouth once daily.      verapamiL (VERELAN) 180 MG C24P Take 180 mg by mouth nightly.      XIFAXAN 550 mg Tab Take 550 mg by mouth 3 (three) times daily.         Allergies  Review of patient's allergies indicates:  No Known Allergies    Physical Examination     Vitals:    07/25/23 1341   BP: (!) 138/100   Pulse: 86   Temp: 98.3 °F (36.8 °C)     Spine Musculoskeletal Exam    Gait    Gait is normal.    Inspection    Cervical Spine    Cervical spine inspection is normal.    Palpation     Thoracolumbar    Tenderness: present      Piriformis: right      SI Joint: right    Range of Motion    Cervical Spine        Cervical spine range of motion additional comments: Restricted ROM in neck due to pain.    Strength    Cervical Spine    Cervical spine motor exam is normal.    Sensory    Cervical Spine    Cervical spine sensation is normal.    Special Tests    Thoracolumbar      Right      DENA test: positive    General      Constitutional: appears stated age, well-developed and well-nourished    Scleral icterus: no    Labored breathing: no    Psychiatric: normal mood and affect and no acute distress    Neurological: alert and oriented x3    Skin: intact    Lymphadenopathy: none   CV: extremities warm, well-perfused  Resp: nonlabored breathing    Assessment and Plan (including Health Maintenance)      Problem List  Smart Sets  Document Outside HM   :    Plan:   Lumbar degenerative disc disease    Chronic back pain greater than 3 months duration    Sacroiliitis    Cervical radiculitis    DDD (degenerative disc disease), cervical       He is getting good relief of his sacroiliac joint pain since his SI joint injection a couple of weeks ago.  He will continue seeing Dr. Johnson in regards to his neck pain and possible cervical spine surgery.  I will follow up with him in 3 months or sooner if needed.    Problem List Items Addressed This Visit       Chronic back pain greater than 3 months duration    Sacroiliitis    Lumbar degenerative disc disease - Primary    Cervical radiculitis    DDD (degenerative disc disease), cervical         Future Appointments   Date Time Provider Department Center   10/24/2023  1:30 PM Beth Brooks MD [facility]              There are no Patient Instructions on file for this visit.  No follow-ups on file.     Signature:  Beth Brooks MD      Date of encounter: 7/25/23

## 2023-10-24 ENCOUNTER — OFFICE VISIT (OUTPATIENT)
Dept: PAIN MEDICINE | Facility: CLINIC | Age: 48
End: 2023-10-24
Payer: OTHER GOVERNMENT

## 2023-10-24 VITALS
BODY MASS INDEX: 31.02 KG/M2 | WEIGHT: 229 LBS | DIASTOLIC BLOOD PRESSURE: 101 MMHG | SYSTOLIC BLOOD PRESSURE: 150 MMHG | HEIGHT: 72 IN | HEART RATE: 80 BPM

## 2023-10-24 DIAGNOSIS — M54.9 CHRONIC BACK PAIN GREATER THAN 3 MONTHS DURATION: ICD-10-CM

## 2023-10-24 DIAGNOSIS — M51.36 LUMBAR DEGENERATIVE DISC DISEASE: ICD-10-CM

## 2023-10-24 DIAGNOSIS — M46.1 SACROILIITIS: Primary | ICD-10-CM

## 2023-10-24 DIAGNOSIS — G89.29 CHRONIC BACK PAIN GREATER THAN 3 MONTHS DURATION: ICD-10-CM

## 2023-10-24 PROCEDURE — 99215 PR OFFICE/OUTPT VISIT, EST, LEVL V, 40-54 MIN: ICD-10-PCS | Mod: ,,, | Performed by: ANESTHESIOLOGY

## 2023-10-24 PROCEDURE — 99215 OFFICE O/P EST HI 40 MIN: CPT | Mod: ,,, | Performed by: ANESTHESIOLOGY

## 2023-10-24 NOTE — H&P (VIEW-ONLY)
Beth Brooks MD        PATIENT NAME: Marlon Franco  : 1975  DATE: 10/24/23  MRN: [id number]      Billing Provider: Beth Brooks MD  Level of Service:   Patient PCP Information       Provider PCP Type    Primary Doctor No General            Reason for Visit / Chief Complaint: Back Pain (3 mos f/u for back pain, will need BRETT for next visit, states pain has increased, states mid back on left side locks up to point where unable to get out of bed, states meds don't offer much relief for pain, )       Update PCP  Update Chief Complaint         History of Present Illness / Problem Focused Workflow     Marlon Franco presents to the clinic with Back Pain (3 mos f/u for back pain, will need BRETT for next visit, states pain has increased, states mid back on left side locks up to point where unable to get out of bed, states meds don't offer much relief for pain, )       This is a 48-year-old male who presents to clinic today for follow up of his chronic neck pain and left-sided lower back pain.  He underwent a right sacroiliac joint injection a couple weeks ago.  He returns today and states that he is getting significant relief of his sacroiliac joint pain since then.  It took a couple of days to start kicking in, but he is now only having minimal pain.  He does still have neck pain, and is planning to [facility] tomorrow.  Overall, he rates his pain level is 5/10.  It gets to 9/10 at worst in the mornings. It gets down to 3/10 at best.  He feels like his SI joint injections that were done in July have been wearing off and he would like to have it repeated.      Back Pain  Associated symptoms include numbness.   Neck Pain   Associated symptoms include numbness.       Review of Systems     Review of Systems   Musculoskeletal:  Positive for back pain, neck pain and neck stiffness.   Neurological:  Positive for dizziness and numbness.   All other systems reviewed  and are negative.       Medical / Social / Family History     Past Medical History:   Diagnosis Date    Carpal tunnel syndrome of left wrist     Carpal tunnel syndrome of right wrist     Essential tremor     GERD (gastroesophageal reflux disease)     HTN (hypertension)     Low back pain     Lumbar radiculitis     Lumbar spinal stenosis     Piriformis syndrome     PTSD (post-traumatic stress disorder)     Sacroiliitis     Sleep apnea, unspecified        Past Surgical History:   Procedure Laterality Date    ANKLE SURGERY Left     BUNIONECTOMY Left     EPIDURAL STEROID INJECTION INTO CERVICAL SPINE N/A 10/12/2022    Procedure: Injection-steroid-epidural-cervical;  Surgeon: Beth Brooks MD;  Location: [facility];  Service: Pain Management;  Laterality: N/A;  C7-T1    FRACTURE SURGERY  1981    Broke left arm    INJECTION OF PIRIFORMIS MUSCLE Left 02/16/2022    INJECTION, SACROILIAC JOINT Left 02/16/2022    INJECTION, SACROILIAC JOINT Right 11/16/2022    Procedure: INJECTION,SACROILIAC JOINT;  Surgeon: Beth Brooks MD;  Location: [facility];  Service: Pain Management;  Laterality: Right;  Right SIJ    INJECTION, SACROILIAC JOINT Left 07/12/2023    Procedure: INJECTION,SACROILIAC JOINT;  Surgeon: Beth Brooks MD;  Location: [facility] OR;  Service: Pain Management;  Laterality: Left;  Left SI Joint Injection    ORIF LEFT ARM      REPAIR OF SCROTUM      SURGICAL REMOVAL OF PILONIDAL CYST      TRANSFORAMINAL EPIDURAL INJECTION OF STEROID Left 02/24/2021    L3 & L4    TRIGGER POINT INJECTION  10/04/2021       Social History  Mr. Franco  reports that he has never smoked. He has never used smokeless tobacco. He reports that he does not currently use alcohol. He reports that he does not use drugs.    Family History  Mr.'s Franco family history includes Arthritis in his maternal aunt; Asthma in his father; Cancer in his mother; Heart disease in his father.    Medications and Allergies     Medications  Outpatient Medications Marked  as Taking for the 10/24/23 encounter (Office Visit) with Cecilia, Beth LUNDY MD   Medication Sig Dispense Refill    adalimumab (HUMIRA,CF,) 40 mg/0.4 mL SyKt Inject 40 mg into the skin every 14 (fourteen) days.      allopurinoL (ZYLOPRIM) 100 MG tablet Take 100 mg by mouth once daily.      alprostadiL (EDEX) 20 mcg injection INJECT 20MCG/1VIL INTRACAVITY EVERY DAY AS NEEDED  FOR THE TREATMENT OF ERECTILE DYSFUNCTION *MAX OF 6 PER 90 DAYS SUPPLY*      buPROPion (WELLBUTRIN XL) 150 MG TB24 tablet Take 1 tablet by mouth once daily.      busPIRone (BUSPAR) 15 MG tablet Take 15 mg by mouth 2 (two) times daily.      carBAMazepine (TEGRETOL) 200 mg tablet Take 200 mg by mouth 2 (two) times daily.      citalopram (CELEXA) 40 MG tablet Take 40 mg by mouth once daily.      clonazePAM (KLONOPIN) 1 MG tablet Take 1 mg by mouth 3 (three) times daily as needed.      cyanocobalamin (VITAMIN B-12) 1000 MCG tablet Take 1 tablet by mouth once daily.      dicyclomine (BENTYL) 20 mg tablet Take 20 mg by mouth once daily.      divalproex (DEPAKOTE) 500 MG TbEC Take 500 mg by mouth nightly.      flecainide (TAMBOCOR) 50 MG Tab Take 50 mg by mouth 2 (two) times daily.      fluticasone propionate (FLONASE) 50 mcg/actuation nasal spray 1 spray by Each Nostril route as needed.      gabapentin (NEURONTIN) 400 MG capsule Take 400 mg by mouth 3 (three) times daily.      hydrALAZINE (APRESOLINE) 25 MG tablet Take 1 tablet by mouth once daily.      HYDROcodone-acetaminophen (NORCO)  mg per tablet Take 1 tablet by mouth every 6 (six) hours as needed.      HYDROcodone-acetaminophen  mg/15 mL Soln Take 1 tablet by mouth every 6 (six) hours as needed.      hydrOXYzine (ATARAX) 50 MG tablet Take 100 mg by mouth nightly.      LIDOcaine (LIDODERM) 5 % Place 1 patch onto the skin every 24 hours. Only prn      meloxicam (MOBIC) 7.5 MG tablet Take 7.5 mg by mouth once daily.      methocarbamoL (ROBAXIN) 750 MG Tab Take 1 tablet (750 mg total) by  mouth 3 (three) times daily as needed (muscle spasms). (Patient taking differently: Take 500 mg by mouth 3 (three) times daily as needed (muscle spasms).) 50 tablet 3    midodrine (PROAMATINE) 5 MG Tab Take 5 mg by mouth Daily.      mirtazapine (REMERON) 45 MG tablet Take 45 mg by mouth every evening.      naloxone (NARCAN) 4 mg/actuation Spry USE 1 SPRAY IN ONE NOSTRIL ONCE AS DIRECTED FOR OPIATE REVERSAL  FOR OPIOID OVERDOSE. DO NOT PRIME NASAL SPRAY. GIVE ADDITIONAL DOSE IF  PATIENT DOES NOT RESPOND WITHIN 2-3 MINUTES OR RESPONDS BUT STOPS  BREATHING AGAIN.  CALL 911.  IF USED, NOTIFY YOUR PROVIDER FOR OPIATE REVERSAL  FOR OPIOID OVERDOSE. DO NOT PRIME NASAL SPRAY. GIVE ADDITIONAL DOSE IF  PATIENT DOES NOT RESPOND WITHIN 2-3 MINUTES OR RESPONDS BUT STOPS  BREATHING AGAIN.  CALL 911.  IF USED, NOTIFY YOUR PROVIDER      nystatin (MYCOSTATIN) 100,000 unit/mL suspension TAKE 5 ML BY MOUTH FOUR TIMES A DAY (SWISH AND SWALLOW)      pantoprazole (PROTONIX) 40 MG tablet Take 40 mg by mouth once daily.      pravastatin (PRAVACHOL) 20 MG tablet Take 20 mg by mouth every evening.      prazosin (MINIPRESS) 2 MG Cap Take 4 mg by mouth every evening.      primidone (MYSOLINE) 50 MG Tab Take 50 mg by mouth 2 (two) times a day.      ramelteon (ROZEREM) 8 mg tablet Take 8 mg by mouth every evening. prn      senna (SENOKOT) 8.6 mg tablet Take 17.2 mg by mouth once daily. prn      tamsulosin (FLOMAX) 0.4 mg Cap 0.4 mg once daily.      testosterone cypionate (DEPOTESTOTERONE CYPIONATE) 200 mg/mL injection 200 mg every 14 (fourteen) days.      testosterone enanthate (DELATESTRYL) 200 mg/mL injection Inject 200 mg into the muscle every 14 (fourteen) days.      topiramate (TOPAMAX) 200 MG Tab Take 200 mg by mouth.      traZODone (DESYREL) 100 MG tablet Take 100 mg by mouth every evening.      venlafaxine (EFFEXOR) 75 MG tablet Take 75 mg by mouth once daily.      verapamiL (VERELAN) 180 MG C24P Take 180 mg by mouth nightly.       XIFAXAN 550 mg Tab Take 550 mg by mouth 3 (three) times daily.         Allergies  Review of patient's allergies indicates:  No Known Allergies    Physical Examination     Vitals:    10/24/23 1334   BP: (!) 150/101   Pulse: 80     Spine Musculoskeletal Exam    Gait    Gait is normal.    Inspection    Cervical Spine    Cervical spine inspection is normal.    Palpation    Thoracolumbar    Tenderness: present      Piriformis: right      SI Joint: right    Range of Motion    Cervical Spine        Cervical spine range of motion additional comments: Restricted ROM in neck due to pain.    Strength    Cervical Spine    Cervical spine motor exam is normal.    Sensory    Cervical Spine    Cervical spine sensation is normal.    Special Tests    Thoracolumbar      Right      DENA test: positive    General      Constitutional: appears stated age, well-developed and well-nourished    Scleral icterus: no    Labored breathing: no    Psychiatric: normal mood and affect and no acute distress    Neurological: alert and oriented x3    Skin: intact    Lymphadenopathy: none     CV: extremities warm, well-perfused  Resp: nonlabored breathing    Assessment and Plan (including Health Maintenance)      Problem List  Smart Sets  Document Outside HM   :    Plan:   Lumbar degenerative disc disease    Chronic back pain greater than 3 months duration    Sacroiliitis       His sacroiliac joint injections done in July has been wearing off and he would like to have them repeated.  I am scheduling him for bilateral sacroiliac joint injections today to help with his bilateral sacroiliitis.  The plan was discussed with the patient and he wishes to proceed.    Problem List Items Addressed This Visit       Chronic back pain greater than 3 months duration    Sacroiliitis    Lumbar degenerative disc disease - Primary         No future appointments.             There are no Patient Instructions on file for this visit.  No follow-ups on file.     Signature:   Beth Brooks MD      Date of encounter: 10/24/23

## 2023-10-24 NOTE — LETTER
October 24, 2023       [facility]  [address] [place] [address]  [facility] [address]  Phone: [phone]  Fax: [phone]       Patient: Marlon Franco   YOB: 1975  Date of Visit: 10/24/2023    To Whom It May Concern:    Nena Franco  was at [facility] on 10/24/2023. If you have any questions or concerns, or if I can be of further assistance, please do not hesitate to contact me.    Sincerely,    Beth Brooks MD/mt

## 2023-10-24 NOTE — PROGRESS NOTES
Beth Brooks MD        PATIENT NAME: Marlon Franco  : 1975  DATE: 10/24/23  MRN: [id number]      Billing Provider: Beth Brooks MD  Level of Service:   Patient PCP Information       Provider PCP Type    Primary Doctor No General            Reason for Visit / Chief Complaint: Back Pain (3 mos f/u for back pain, will need BRETT for next visit, states pain has increased, states mid back on left side locks up to point where unable to get out of bed, states meds don't offer much relief for pain, )       Update PCP  Update Chief Complaint         History of Present Illness / Problem Focused Workflow     Marlon Franco presents to the clinic with Back Pain (3 mos f/u for back pain, will need BRETT for next visit, states pain has increased, states mid back on left side locks up to point where unable to get out of bed, states meds don't offer much relief for pain, )       This is a 48-year-old male who presents to clinic today for follow up of his chronic neck pain and left-sided lower back pain.  He underwent a right sacroiliac joint injection a couple weeks ago.  He returns today and states that he is getting significant relief of his sacroiliac joint pain since then.  It took a couple of days to start kicking in, but he is now only having minimal pain.  He does still have neck pain, and is planning to [facility] for a [facility] referral tomorrow.  Overall, he rates his pain level is 5/10.  It gets to 9/10 at worst in the mornings. It gets down to 3/10 at best.  He feels like his SI joint injections that were done in July have been wearing off and he would like to have it repeated.      Back Pain  Associated symptoms include numbness.   Neck Pain   Associated symptoms include numbness.       Review of Systems     Review of Systems   Musculoskeletal:  Positive for back pain, neck pain and neck stiffness.   Neurological:  Positive for dizziness and numbness.   All other systems reviewed  and are negative.       Medical / Social / Family History     Past Medical History:   Diagnosis Date    Carpal tunnel syndrome of left wrist     Carpal tunnel syndrome of right wrist     Essential tremor     GERD (gastroesophageal reflux disease)     HTN (hypertension)     Low back pain     Lumbar radiculitis     Lumbar spinal stenosis     Piriformis syndrome     PTSD (post-traumatic stress disorder)     Sacroiliitis     Sleep apnea, unspecified        Past Surgical History:   Procedure Laterality Date    ANKLE SURGERY Left     BUNIONECTOMY Left     EPIDURAL STEROID INJECTION INTO CERVICAL SPINE N/A 10/12/2022    Procedure: Injection-steroid-epidural-cervical;  Surgeon: Beth Brooks MD;  Location: [facility];  Service: Pain Management;  Laterality: N/A;  C7-T1    FRACTURE SURGERY  1981    Broke left arm    INJECTION OF PIRIFORMIS MUSCLE Left 02/16/2022    INJECTION, SACROILIAC JOINT Left 02/16/2022    INJECTION, SACROILIAC JOINT Right 11/16/2022    Procedure: INJECTION,SACROILIAC JOINT;  Surgeon: Beth Brooks MD;  Location: [facility];  Service: Pain Management;  Laterality: Right;  Right SIJ    INJECTION, SACROILIAC JOINT Left 07/12/2023    Procedure: INJECTION,SACROILIAC JOINT;  Surgeon: Beth Brooks MD;  Location: [facility] OR;  Service: Pain Management;  Laterality: Left;  Left SI Joint Injection    ORIF LEFT ARM      REPAIR OF SCROTUM      SURGICAL REMOVAL OF PILONIDAL CYST      TRANSFORAMINAL EPIDURAL INJECTION OF STEROID Left 02/24/2021    L3 & L4    TRIGGER POINT INJECTION  10/04/2021       Social History  Mr. Franco  reports that he has never smoked. He has never used smokeless tobacco. He reports that he does not currently use alcohol. He reports that he does not use drugs.    Family History  Mr.'s Franco family history includes Arthritis in his maternal aunt; Asthma in his father; Cancer in his mother; Heart disease in his father.    Medications and Allergies     Medications  Outpatient Medications Marked  as Taking for the 10/24/23 encounter (Office Visit) with Cecilia, Beth LUNDY MD   Medication Sig Dispense Refill    adalimumab (HUMIRA,CF,) 40 mg/0.4 mL SyKt Inject 40 mg into the skin every 14 (fourteen) days.      allopurinoL (ZYLOPRIM) 100 MG tablet Take 100 mg by mouth once daily.      alprostadiL (EDEX) 20 mcg injection INJECT 20MCG/1VIL INTRACAVITY EVERY DAY AS NEEDED  FOR THE TREATMENT OF ERECTILE DYSFUNCTION *MAX OF 6 PER 90 DAYS SUPPLY*      buPROPion (WELLBUTRIN XL) 150 MG TB24 tablet Take 1 tablet by mouth once daily.      busPIRone (BUSPAR) 15 MG tablet Take 15 mg by mouth 2 (two) times daily.      carBAMazepine (TEGRETOL) 200 mg tablet Take 200 mg by mouth 2 (two) times daily.      citalopram (CELEXA) 40 MG tablet Take 40 mg by mouth once daily.      clonazePAM (KLONOPIN) 1 MG tablet Take 1 mg by mouth 3 (three) times daily as needed.      cyanocobalamin (VITAMIN B-12) 1000 MCG tablet Take 1 tablet by mouth once daily.      dicyclomine (BENTYL) 20 mg tablet Take 20 mg by mouth once daily.      divalproex (DEPAKOTE) 500 MG TbEC Take 500 mg by mouth nightly.      flecainide (TAMBOCOR) 50 MG Tab Take 50 mg by mouth 2 (two) times daily.      fluticasone propionate (FLONASE) 50 mcg/actuation nasal spray 1 spray by Each Nostril route as needed.      gabapentin (NEURONTIN) 400 MG capsule Take 400 mg by mouth 3 (three) times daily.      hydrALAZINE (APRESOLINE) 25 MG tablet Take 1 tablet by mouth once daily.      HYDROcodone-acetaminophen (NORCO)  mg per tablet Take 1 tablet by mouth every 6 (six) hours as needed.      HYDROcodone-acetaminophen  mg/15 mL Soln Take 1 tablet by mouth every 6 (six) hours as needed.      hydrOXYzine (ATARAX) 50 MG tablet Take 100 mg by mouth nightly.      LIDOcaine (LIDODERM) 5 % Place 1 patch onto the skin every 24 hours. Only prn      meloxicam (MOBIC) 7.5 MG tablet Take 7.5 mg by mouth once daily.      methocarbamoL (ROBAXIN) 750 MG Tab Take 1 tablet (750 mg total) by  mouth 3 (three) times daily as needed (muscle spasms). (Patient taking differently: Take 500 mg by mouth 3 (three) times daily as needed (muscle spasms).) 50 tablet 3    midodrine (PROAMATINE) 5 MG Tab Take 5 mg by mouth Daily.      mirtazapine (REMERON) 45 MG tablet Take 45 mg by mouth every evening.      naloxone (NARCAN) 4 mg/actuation Spry USE 1 SPRAY IN ONE NOSTRIL ONCE AS DIRECTED FOR OPIATE REVERSAL  FOR OPIOID OVERDOSE. DO NOT PRIME NASAL SPRAY. GIVE ADDITIONAL DOSE IF  PATIENT DOES NOT RESPOND WITHIN 2-3 MINUTES OR RESPONDS BUT STOPS  BREATHING AGAIN.  CALL 911.  IF USED, NOTIFY YOUR PROVIDER FOR OPIATE REVERSAL  FOR OPIOID OVERDOSE. DO NOT PRIME NASAL SPRAY. GIVE ADDITIONAL DOSE IF  PATIENT DOES NOT RESPOND WITHIN 2-3 MINUTES OR RESPONDS BUT STOPS  BREATHING AGAIN.  CALL 911.  IF USED, NOTIFY YOUR PROVIDER      nystatin (MYCOSTATIN) 100,000 unit/mL suspension TAKE 5 ML BY MOUTH FOUR TIMES A DAY (SWISH AND SWALLOW)      pantoprazole (PROTONIX) 40 MG tablet Take 40 mg by mouth once daily.      pravastatin (PRAVACHOL) 20 MG tablet Take 20 mg by mouth every evening.      prazosin (MINIPRESS) 2 MG Cap Take 4 mg by mouth every evening.      primidone (MYSOLINE) 50 MG Tab Take 50 mg by mouth 2 (two) times a day.      ramelteon (ROZEREM) 8 mg tablet Take 8 mg by mouth every evening. prn      senna (SENOKOT) 8.6 mg tablet Take 17.2 mg by mouth once daily. prn      tamsulosin (FLOMAX) 0.4 mg Cap 0.4 mg once daily.      testosterone cypionate (DEPOTESTOTERONE CYPIONATE) 200 mg/mL injection 200 mg every 14 (fourteen) days.      testosterone enanthate (DELATESTRYL) 200 mg/mL injection Inject 200 mg into the muscle every 14 (fourteen) days.      topiramate (TOPAMAX) 200 MG Tab Take 200 mg by mouth.      traZODone (DESYREL) 100 MG tablet Take 100 mg by mouth every evening.      venlafaxine (EFFEXOR) 75 MG tablet Take 75 mg by mouth once daily.      verapamiL (VERELAN) 180 MG C24P Take 180 mg by mouth nightly.       XIFAXAN 550 mg Tab Take 550 mg by mouth 3 (three) times daily.         Allergies  Review of patient's allergies indicates:  No Known Allergies    Physical Examination     Vitals:    10/24/23 1334   BP: (!) 150/101   Pulse: 80     Spine Musculoskeletal Exam    Gait    Gait is normal.    Inspection    Cervical Spine    Cervical spine inspection is normal.    Palpation    Thoracolumbar    Tenderness: present      Piriformis: right      SI Joint: right    Range of Motion    Cervical Spine        Cervical spine range of motion additional comments: Restricted ROM in neck due to pain.    Strength    Cervical Spine    Cervical spine motor exam is normal.    Sensory    Cervical Spine    Cervical spine sensation is normal.    Special Tests    Thoracolumbar      Right      DENA test: positive    General      Constitutional: appears stated age, well-developed and well-nourished    Scleral icterus: no    Labored breathing: no    Psychiatric: normal mood and affect and no acute distress    Neurological: alert and oriented x3    Skin: intact    Lymphadenopathy: none     CV: extremities warm, well-perfused  Resp: nonlabored breathing    Assessment and Plan (including Health Maintenance)      Problem List  Smart Sets  Document Outside HM   :    Plan:   Lumbar degenerative disc disease    Chronic back pain greater than 3 months duration    Sacroiliitis       His sacroiliac joint injections done in July has been wearing off and he would like to have them repeated.  I am scheduling him for bilateral sacroiliac joint injections today to help with his bilateral sacroiliitis.  The plan was discussed with the patient and he wishes to proceed.    Problem List Items Addressed This Visit       Chronic back pain greater than 3 months duration    Sacroiliitis    Lumbar degenerative disc disease - Primary         No future appointments.             There are no Patient Instructions on file for this visit.  No follow-ups on file.     Signature:   Beth Brooks MD      Date of encounter: 10/24/23

## 2023-11-14 ENCOUNTER — ANESTHESIA EVENT (OUTPATIENT)
Dept: SURGERY | Facility: HOSPITAL | Age: 48
End: 2023-11-14
Payer: OTHER GOVERNMENT

## 2023-11-22 ENCOUNTER — ANESTHESIA (OUTPATIENT)
Dept: SURGERY | Facility: HOSPITAL | Age: 48
End: 2023-11-22
Payer: OTHER GOVERNMENT

## 2023-11-22 ENCOUNTER — HOSPITAL ENCOUNTER (OUTPATIENT)
Facility: HOSPITAL | Age: 48
Discharge: HOME | End: 2023-11-22
Attending: ANESTHESIOLOGY | Admitting: ANESTHESIOLOGY
Payer: OTHER GOVERNMENT

## 2023-11-22 VITALS
DIASTOLIC BLOOD PRESSURE: 105 MMHG | SYSTOLIC BLOOD PRESSURE: 136 MMHG | OXYGEN SATURATION: 94 % | TEMPERATURE: 97 F | HEIGHT: 72 IN | RESPIRATION RATE: 20 BRPM | BODY MASS INDEX: 31.86 KG/M2 | WEIGHT: 235.25 LBS | HEART RATE: 59 BPM

## 2023-11-22 DIAGNOSIS — M46.1 SACROILIITIS: ICD-10-CM

## 2023-11-22 PROCEDURE — D9220A PRA ANESTHESIA: ICD-10-PCS | Mod: CRNA,,, | Performed by: NURSE ANESTHETIST, CERTIFIED REGISTERED

## 2023-11-22 PROCEDURE — 27096 INJECT SACROILIAC JOINT: CPT | Mod: 50 | Performed by: ANESTHESIOLOGY

## 2023-11-22 PROCEDURE — 27096 INJECT SACROILIAC JOINT: CPT | Mod: 50,,, | Performed by: ANESTHESIOLOGY

## 2023-11-22 PROCEDURE — D9220A PRA ANESTHESIA: Mod: ANES,,, | Performed by: ANESTHESIOLOGY

## 2023-11-22 PROCEDURE — 63600175 PHARM REV CODE 636 W HCPCS: Performed by: ANESTHESIOLOGY

## 2023-11-22 PROCEDURE — 25000003 PHARM REV CODE 250: Performed by: NURSE ANESTHETIST, CERTIFIED REGISTERED

## 2023-11-22 PROCEDURE — 25000003 PHARM REV CODE 250: Performed by: ANESTHESIOLOGY

## 2023-11-22 PROCEDURE — 37000008 HC ANESTHESIA 1ST 15 MINUTES: Performed by: ANESTHESIOLOGY

## 2023-11-22 PROCEDURE — D9220A PRA ANESTHESIA: ICD-10-PCS | Mod: ANES,,, | Performed by: ANESTHESIOLOGY

## 2023-11-22 PROCEDURE — 27096 PR INJECTION,SACROILIAC JOINT: ICD-10-PCS | Mod: 50,,, | Performed by: ANESTHESIOLOGY

## 2023-11-22 PROCEDURE — D9220A PRA ANESTHESIA: Mod: CRNA,,, | Performed by: NURSE ANESTHETIST, CERTIFIED REGISTERED

## 2023-11-22 RX ORDER — LIDOCAINE HYDROCHLORIDE 10 MG/ML
1 INJECTION, SOLUTION EPIDURAL; INFILTRATION; INTRACAUDAL; PERINEURAL ONCE
Status: CANCELLED | OUTPATIENT
Start: 2023-11-22 | End: 2023-11-22

## 2023-11-22 RX ORDER — TRIAMCINOLONE ACETONIDE 40 MG/ML
INJECTION, SUSPENSION INTRA-ARTICULAR; INTRAMUSCULAR
Status: DISCONTINUED
Start: 2023-11-22 | End: 2023-11-22 | Stop reason: HOSPADM

## 2023-11-22 RX ORDER — BUPIVACAINE HYDROCHLORIDE 2.5 MG/ML
INJECTION, SOLUTION EPIDURAL; INFILTRATION; INTRACAUDAL
Status: DISCONTINUED | OUTPATIENT
Start: 2023-11-22 | End: 2023-11-22 | Stop reason: HOSPADM

## 2023-11-22 RX ORDER — KETAMINE HYDROCHLORIDE 50 MG/ML
INJECTION, SOLUTION INTRAMUSCULAR; INTRAVENOUS
Status: DISCONTINUED | OUTPATIENT
Start: 2023-11-22 | End: 2023-11-22

## 2023-11-22 RX ORDER — LIDOCAINE HYDROCHLORIDE 10 MG/ML
INJECTION INFILTRATION; PERINEURAL
Status: DISCONTINUED | OUTPATIENT
Start: 2023-11-22 | End: 2023-11-22

## 2023-11-22 RX ORDER — LIDOCAINE HYDROCHLORIDE 10 MG/ML
INJECTION, SOLUTION EPIDURAL; INFILTRATION; INTRACAUDAL; PERINEURAL
Status: DISCONTINUED
Start: 2023-11-22 | End: 2023-11-22 | Stop reason: HOSPADM

## 2023-11-22 RX ORDER — PROPOFOL 10 MG/ML
VIAL (ML) INTRAVENOUS
Status: DISCONTINUED | OUTPATIENT
Start: 2023-11-22 | End: 2023-11-22

## 2023-11-22 RX ORDER — TRIAMCINOLONE ACETONIDE 40 MG/ML
INJECTION, SUSPENSION INTRA-ARTICULAR; INTRAMUSCULAR
Status: DISCONTINUED | OUTPATIENT
Start: 2023-11-22 | End: 2023-11-22 | Stop reason: HOSPADM

## 2023-11-22 RX ORDER — LIDOCAINE HYDROCHLORIDE 10 MG/ML
INJECTION, SOLUTION EPIDURAL; INFILTRATION; INTRACAUDAL; PERINEURAL
Status: DISCONTINUED | OUTPATIENT
Start: 2023-11-22 | End: 2023-11-22 | Stop reason: HOSPADM

## 2023-11-22 RX ORDER — BUPIVACAINE HYDROCHLORIDE 2.5 MG/ML
INJECTION, SOLUTION EPIDURAL; INFILTRATION; INTRACAUDAL
Status: DISCONTINUED
Start: 2023-11-22 | End: 2023-11-22 | Stop reason: HOSPADM

## 2023-11-22 RX ORDER — SODIUM CHLORIDE, SODIUM GLUCONATE, SODIUM ACETATE, POTASSIUM CHLORIDE AND MAGNESIUM CHLORIDE 30; 37; 368; 526; 502 MG/100ML; MG/100ML; MG/100ML; MG/100ML; MG/100ML
INJECTION, SOLUTION INTRAVENOUS CONTINUOUS
Status: CANCELLED | OUTPATIENT
Start: 2023-11-22 | End: 2023-12-22

## 2023-11-22 RX ADMIN — LIDOCAINE HYDROCHLORIDE 50 MG: 10 INJECTION, SOLUTION INFILTRATION; PERINEURAL at 10:11

## 2023-11-22 RX ADMIN — Medication 40 MG: at 10:11

## 2023-11-22 RX ADMIN — KETAMINE HYDROCHLORIDE 10 MG: 50 INJECTION INTRAMUSCULAR; INTRAVENOUS at 10:11

## 2023-11-22 NOTE — DISCHARGE SUMMARY
[facility] - Periop Services  Discharge Note  Short Stay    Procedure(s) (LRB):  INJECTION,SACROILIAC JOINT (Bilateral)      OUTCOME: Patient tolerated treatment/procedure well without complication and is now ready for discharge.    DISPOSITION: Home or Self Care    FINAL DIAGNOSIS:  <principal problem not specified>    FOLLOWUP: In clinic    DISCHARGE INSTRUCTIONS:  No discharge procedures on file.     TIME SPENT ON DISCHARGE: 5 minutes

## 2023-11-22 NOTE — OP NOTE
Bilateral Sacroiliac Joint Injections    Pre-Procedure Diagnoses:  1. Chronic pain syndrome  2. Bilateral SI joint pain  3. Bilateral sacroiliitis  4. Low back pain    Post-Procedure Diagnoses:  1. Chronic pain syndrome  2. Bilateral SI joint pain  3. Bilateral sacroiliitis  4. Low back pain    Anesthesia:  Local and MAC    Estimated Blood Loss:  None    Complications:  None    Informed Consent:  The procedure, risks, benefits, and alternatives were discussed with the patient. There were no contraindications to the procedure. The patient expressed understanding and agreed to proceed. Fully informed written consent was obtained.     Description of the Procedure:  The patient was taken to the operating room. IV access was obtained prior to the start of the procedure. The patient was positioned prone on the fluoroscopy table. Continuous hemodynamic monitoring was initiated and continued throughout the duration of the procedure. The skin overlying the lumbosacral spine was prepped with Chloraprep and draped into a sterile field. Fluoroscopy was used to identify the location of the left SI joint. Skin anesthesia was achieved using 1 mL of 1% lidocaine over the injection site. A 22 gauge 3.5 inch Quinke spinal needle was slowly inserted and advanced under intermittent fluoroscopy through the SI joint capsule. Proper needle position was confirmed under AP, oblique, and lateral fluoroscopic views. Negative aspiration was confirmed. Then a combination of 20 mg of Kenalog and 1.5 mL of 0.25% bupivacaine was easily injected. There was no pain on injection. The needle was removed intact and bleeding was nil. The same procedure was repeated in identical fashion on the right side. Sterile bandages were applied. The patient was taken to the recovery room for further observation in stable condition. The patient was then discharged home without any complications.

## 2023-11-22 NOTE — ANESTHESIA PREPROCEDURE EVALUATION
11/22/2023  Marlon Franco is a 48 y.o., male.who presents with chronic back pain.  Diagnosis:      Lumbar degenerative disc disease [M51.36]      Chronic back pain greater than 3 months duration [M54.9, G89.29]      Sacroiliitis [M46.1]   Pre-op diagnosis:      Lumbar degenerative disc disease [M51.36]      Chronic back pain greater than 3 months duration [M54.9, G89.29]      Sacroiliitis [M46.1]           The pt. Comes to [facility] for the noted pain management procedure under GA/TIVA.  Procedure: INJECTION,SACROILIAC JOINT (Bilateral) - Bilateral SI Joint Injection   Anesthesia type: Local MAC         PMHx:  Low back pain    Other Medical History   Carpal tunnel syndrome of left wrist Carpal tunnel syndrome of right wrist   Essential tremor GERD (gastroesophageal reflux disease)   HTN (hypertension) Lumbar radiculitis   Lumbar spinal stenosis Piriformis syndrome   PTSD (post-traumatic stress disorder) Sacroiliitis   Sleep apnea, unspecified      Surgical History:  INJECTION OF PIRIFORMIS MUSCLE TRIGGER POINT INJECTION   TRANSFORAMINAL EPIDURAL INJECTION OF STEROID INJECTION, SACROILIAC JOINT   BUNIONECTOMY ANKLE SURGERY   ORIF LEFT ARM SURGICAL REMOVAL OF PILONIDAL CYST   REPAIR OF SCROTUM EPIDURAL STEROID INJECTION INTO CERVICAL SPINE   FRACTURE SURGERY INJECTION, SACROILIAC JOINT   INJECTION, SACROILIAC JOINT          Vital signs:              Pre-op Assessment    I have reviewed the Patient Summary Reports.     I have reviewed the Nursing Notes. I have reviewed the NPO Status.   I have reviewed the Medications.     Review of Systems  Anesthesia Hx:  No problems with previous Anesthesia                Social:  Non-Smoker       Hematology/Oncology:  Hematology Normal   Oncology Normal                                   EENT/Dental:  EENT/Dental Normal           Cardiovascular:  Exercise tolerance: good    Hypertension                Functional Capacity good / => 4 METS                         Pulmonary:        Sleep Apnea                Renal/:  Renal/ Normal                 Hepatic/GI:     GERD             Musculoskeletal:  Arthritis               Neurological:    Neuromuscular Disease,                                   Endocrine:  Endocrine Normal          Obesity / BMI > 30  Dermatological:  Skin Normal    Psych:  Psychiatric History                     Anesthesia Plan  Type of Anesthesia, risks & benefits discussed:    Anesthesia Type: Gen Natural Airway  Intra-op Monitoring Plan: Standard ASA Monitors  Post Op Pain Control Plan: IV/PO Opioids PRN  Induction:  IV  Informed Consent: Informed consent signed with the Patient and all parties understand the risks and agree with anesthesia plan.  All questions answered.   ASA Score: 2  Day of Surgery Review of History & Physical: H&P Update referred to the surgeon/provider.    Ready For Surgery From Anesthesia Perspective.     .

## 2023-11-22 NOTE — ANESTHESIA POSTPROCEDURE EVALUATION
Anesthesia Post Evaluation    Patient: Marlon Franco    Procedure(s) Performed: Procedure(s) (LRB):  INJECTION,SACROILIAC JOINT (Bilateral)    Final Anesthesia Type: MAC      Patient location during evaluation: OPS  Patient participation: Yes- Able to Participate  Level of consciousness: awake and alert and oriented  Post-procedure vital signs: reviewed and stable  Pain management: adequate  Airway patency: patent    PONV status at discharge: No PONV, vomiting (controlled) and nausea (controlled)  Anesthetic complications: no      Cardiovascular status: stable and blood pressure returned to baseline  Respiratory status: unassisted  Hydration status: euvolemic            Vitals Value Taken Time   /105 11/22/23 1031   Temp 36.1 °C (97 °F) 11/22/23 1030   Pulse 64 11/22/23 1039   Resp 20 11/22/23 1030   SpO2 99 % 11/22/23 1039   Vitals shown include unvalidated device data.      No case tracking events are documented in the log.      Pain/Amos Score: Maos Score: 10 (11/22/2023 10:46 AM)  Modified Amos Score: 20 (11/22/2023 10:46 AM)

## 2023-11-22 NOTE — TRANSFER OF CARE
Anesthesia Transfer of Care Note    Patient: Marlon Franco    Procedure(s) Performed: Procedure(s) (LRB):  INJECTION,SACROILIAC JOINT (Bilateral)    Patient location: OPS    Anesthesia Type: MAC    Transport from OR: Transported from OR on room air with adequate spontaneous ventilation    Post pain: adequate analgesia    Post assessment: no apparent anesthetic complications    Post vital signs: stable    Level of consciousness: sedated, awake and responds to stimulation    Nausea/Vomiting: no nausea/vomiting    Complications: none    Transfer of care protocol was followed      Last vitals: Visit Vitals  BP (!) 151/93   Pulse 66   Temp 36.1 °C (96.9 °F) (Tympanic)   Resp 20   Ht 6' (1.829 m)   Wt 106.7 kg (235 lb 3.7 oz)   SpO2 (!) 94%   BMI 31.90 kg/m²

## 2024-02-22 ENCOUNTER — OFFICE VISIT (OUTPATIENT)
Dept: PAIN MEDICINE | Facility: CLINIC | Age: 49
End: 2024-02-22
Payer: OTHER GOVERNMENT

## 2024-02-22 VITALS
BODY MASS INDEX: 31.83 KG/M2 | WEIGHT: 235 LBS | HEIGHT: 72 IN | DIASTOLIC BLOOD PRESSURE: 100 MMHG | SYSTOLIC BLOOD PRESSURE: 146 MMHG

## 2024-02-22 DIAGNOSIS — M54.9 CHRONIC BACK PAIN GREATER THAN 3 MONTHS DURATION: ICD-10-CM

## 2024-02-22 DIAGNOSIS — M51.36 LUMBAR DEGENERATIVE DISC DISEASE: Primary | ICD-10-CM

## 2024-02-22 DIAGNOSIS — M50.30 DDD (DEGENERATIVE DISC DISEASE), CERVICAL: ICD-10-CM

## 2024-02-22 DIAGNOSIS — M54.12 CERVICAL RADICULITIS: ICD-10-CM

## 2024-02-22 DIAGNOSIS — M46.1 SACROILIITIS: ICD-10-CM

## 2024-02-22 DIAGNOSIS — G89.29 CHRONIC BACK PAIN GREATER THAN 3 MONTHS DURATION: ICD-10-CM

## 2024-02-22 PROCEDURE — 99213 OFFICE O/P EST LOW 20 MIN: CPT | Mod: ,,, | Performed by: ANESTHESIOLOGY

## 2024-02-22 NOTE — PROGRESS NOTES
Beth Brooks MD        PATIENT NAME: Marlon Franco  : 1975  DATE: 24  MRN: [id number]      Billing Provider: Beth Brooks MD  Level of Service:   Patient PCP Information       Provider PCP Type    Primary Doctor No General            Reason for Visit / Chief Complaint: Low-back Pain (Post op tapan SI joint 23  C/O pain level 5-6, Taking pain meds.states procedure helped some. )       Update PCP  Update Chief Complaint         History of Present Illness / Problem Focused Workflow     Marlon Franco presents to the clinic with Low-back Pain (Post op tapan SI joint 23  C/O pain level 5-6, Taking pain meds.states procedure helped some. )       This is a 48-year-old male who presents to clinic today for follow up of his chronic neck pain and lower back pain.  He underwent bilateral sacroiliac joint injections back in November of last year.  He states that he has gotten relief from that procedure.  He is complaining more of neck pain today.  His last cervical epidural steroid injection was then 2022.  He would like to have this repeated.      Back Pain  Associated symptoms include numbness.   Neck Pain   Associated symptoms include numbness.   Low-back Pain  Associated symptoms include numbness.       Review of Systems     Review of Systems   Musculoskeletal:  Positive for back pain, neck pain and neck stiffness.   Neurological:  Positive for dizziness and numbness.   All other systems reviewed and are negative.       Medical / Social / Family History     Past Medical History:   Diagnosis Date    Carpal tunnel syndrome of left wrist     Carpal tunnel syndrome of right wrist     Essential tremor     GERD (gastroesophageal reflux disease)     HTN (hypertension)     Low back pain     Lumbar radiculitis     Lumbar spinal stenosis     Piriformis syndrome     PTSD (post-traumatic stress disorder)     Sacroiliitis     Sleep apnea, unspecified        Past Surgical History:   Procedure  Laterality Date    ANKLE SURGERY Left     BUNIONECTOMY Left     EPIDURAL STEROID INJECTION INTO CERVICAL SPINE N/A 10/12/2022    Procedure: Injection-steroid-epidural-cervical;  Surgeon: Beth Brooks MD;  Location: [facility] OR;  Service: Pain Management;  Laterality: N/A;  C7-T1    FRACTURE SURGERY  1981    Broke left arm    INJECTION OF PIRIFORMIS MUSCLE Left 02/16/2022    INJECTION, SACROILIAC JOINT Left 02/16/2022    INJECTION, SACROILIAC JOINT Right 11/16/2022    Procedure: INJECTION,SACROILIAC JOINT;  Surgeon: Beth Brooks MD;  Location: [facility] OR;  Service: Pain Management;  Laterality: Right;  Right SIJ    INJECTION, SACROILIAC JOINT Left 07/12/2023    Procedure: INJECTION,SACROILIAC JOINT;  Surgeon: Beth Brooks MD;  Location: [facility] OR;  Service: Pain Management;  Laterality: Left;  Left SI Joint Injection    INJECTION, SACROILIAC JOINT Bilateral 11/22/2023    Procedure: INJECTION,SACROILIAC JOINT;  Surgeon: Beth Brooks MD;  Location: [facility] OR;  Service: Pain Management;  Laterality: Bilateral;  Bilateral SI Joint Injection    ORIF LEFT ARM      REPAIR OF SCROTUM      SURGICAL REMOVAL OF PILONIDAL CYST      TRANSFORAMINAL EPIDURAL INJECTION OF STEROID Left 02/24/2021    L3 & L4    TRIGGER POINT INJECTION  10/04/2021       Social History  Mr. Franco  reports that he has never smoked. He has never used smokeless tobacco. He reports that he does not currently use alcohol. He reports that he does not use drugs.    Family History  Mr.'s Franco family history includes Arthritis in his maternal aunt; Asthma in his father; Cancer in his mother; Heart disease in his father.    Medications and Allergies     Medications  Outpatient Medications Marked as Taking for the 2/22/24 encounter (Office Visit) with Beth Brooks MD   Medication Sig Dispense Refill    adalimumab (HUMIRA,CF,) 40 mg/0.4 mL SyKt Inject 40 mg into the skin every 14 (fourteen) days.      allopurinoL (ZYLOPRIM) 100 MG tablet Take 100 mg by  mouth once daily.      alprostadiL (EDEX) 20 mcg injection INJECT 20MCG/1VIL INTRACAVITY EVERY DAY AS NEEDED  FOR THE TREATMENT OF ERECTILE DYSFUNCTION *MAX OF 6 PER 90 DAYS SUPPLY*      buPROPion (WELLBUTRIN XL) 150 MG TB24 tablet Take 1 tablet by mouth once daily.      busPIRone (BUSPAR) 15 MG tablet Take 15 mg by mouth 2 (two) times daily.      carBAMazepine (TEGRETOL) 200 mg tablet Take 200 mg by mouth 2 (two) times daily.      citalopram (CELEXA) 40 MG tablet Take 40 mg by mouth once daily.      clonazePAM (KLONOPIN) 1 MG tablet Take 1 mg by mouth 3 (three) times daily as needed.      cyanocobalamin (VITAMIN B-12) 1000 MCG tablet Take 1 tablet by mouth once daily.      dicyclomine (BENTYL) 20 mg tablet Take 20 mg by mouth once daily.      divalproex (DEPAKOTE) 500 MG TbEC Take 500 mg by mouth nightly.      flecainide (TAMBOCOR) 50 MG Tab Take 50 mg by mouth 2 (two) times daily.      fluticasone propionate (FLONASE) 50 mcg/actuation nasal spray 1 spray by Each Nostril route as needed.      gabapentin (NEURONTIN) 400 MG capsule Take 400 mg by mouth 3 (three) times daily.      hydrALAZINE (APRESOLINE) 25 MG tablet Take 1 tablet by mouth once daily.      HYDROcodone-acetaminophen (NORCO)  mg per tablet Take 1 tablet by mouth every 6 (six) hours as needed.      HYDROcodone-acetaminophen  mg/15 mL Soln Take 1 tablet by mouth every 6 (six) hours as needed.      hydrOXYzine (ATARAX) 50 MG tablet Take 100 mg by mouth nightly.      LIDOcaine (LIDODERM) 5 % Place 1 patch onto the skin every 24 hours. Only prn      meloxicam (MOBIC) 7.5 MG tablet Take 7.5 mg by mouth once daily.      methocarbamoL (ROBAXIN) 750 MG Tab Take 1 tablet (750 mg total) by mouth 3 (three) times daily as needed (muscle spasms). (Patient taking differently: Take 500 mg by mouth 3 (three) times daily as needed (muscle spasms).) 50 tablet 3    midodrine (PROAMATINE) 5 MG Tab Take 5 mg by mouth Daily.      mirtazapine (REMERON) 45 MG  tablet Take 45 mg by mouth every evening.      naloxone (NARCAN) 4 mg/actuation Spry USE 1 SPRAY IN ONE NOSTRIL ONCE AS DIRECTED FOR OPIATE REVERSAL  FOR OPIOID OVERDOSE. DO NOT PRIME NASAL SPRAY. GIVE ADDITIONAL DOSE IF  PATIENT DOES NOT RESPOND WITHIN 2-3 MINUTES OR RESPONDS BUT STOPS  BREATHING AGAIN.  CALL 911.  IF USED, NOTIFY YOUR PROVIDER FOR OPIATE REVERSAL  FOR OPIOID OVERDOSE. DO NOT PRIME NASAL SPRAY. GIVE ADDITIONAL DOSE IF  PATIENT DOES NOT RESPOND WITHIN 2-3 MINUTES OR RESPONDS BUT STOPS  BREATHING AGAIN.  CALL 911.  IF USED, NOTIFY YOUR PROVIDER      nystatin (MYCOSTATIN) 100,000 unit/mL suspension TAKE 5 ML BY MOUTH FOUR TIMES A DAY (SWISH AND SWALLOW)      pantoprazole (PROTONIX) 40 MG tablet Take 40 mg by mouth once daily.      pravastatin (PRAVACHOL) 20 MG tablet Take 20 mg by mouth every evening.      prazosin (MINIPRESS) 2 MG Cap Take 4 mg by mouth every evening.      primidone (MYSOLINE) 50 MG Tab Take 50 mg by mouth 2 (two) times a day.      ramelteon (ROZEREM) 8 mg tablet Take 8 mg by mouth every evening. prn      senna (SENOKOT) 8.6 mg tablet Take 17.2 mg by mouth once daily. prn      tamsulosin (FLOMAX) 0.4 mg Cap 0.4 mg once daily.      testosterone cypionate (DEPOTESTOTERONE CYPIONATE) 200 mg/mL injection 200 mg every 14 (fourteen) days.      testosterone enanthate (DELATESTRYL) 200 mg/mL injection Inject 200 mg into the muscle every 14 (fourteen) days.      topiramate (TOPAMAX) 200 MG Tab Take 200 mg by mouth.      traZODone (DESYREL) 100 MG tablet Take 100 mg by mouth every evening.      venlafaxine (EFFEXOR) 75 MG tablet Take 75 mg by mouth once daily.      verapamiL (VERELAN) 180 MG C24P Take 180 mg by mouth nightly.      XIFAXAN 550 mg Tab Take 550 mg by mouth 3 (three) times daily.         Allergies  Review of patient's allergies indicates:  No Known Allergies    Physical Examination     Vitals:    02/22/24 1352   BP: (!) 146/100     Spine Musculoskeletal Exam    Gait    Gait is  normal.    Inspection    Cervical Spine    Cervical spine inspection is normal.    Palpation    Thoracolumbar    Tenderness: present      Piriformis: right      SI Joint: right    Range of Motion    Cervical Spine        Cervical spine range of motion additional comments: Restricted ROM in neck due to pain.    Strength    Cervical Spine    Cervical spine motor exam is normal.    Sensory    Cervical Spine    Cervical spine sensation is normal.    Special Tests    Thoracolumbar      Right      DENA test: positive    General      Constitutional: appears stated age, well-developed and well-nourished    Scleral icterus: no    Labored breathing: no    Psychiatric: normal mood and affect and no acute distress    Neurological: alert and oriented x3    Skin: intact    Lymphadenopathy: none     CV: extremities warm, well-perfused  Resp: nonlabored breathing    Assessment and Plan (including Health Maintenance)      Problem List  Smart Sets  Document Outside HM   :    Plan:   Lumbar degenerative disc disease    Cervical radiculitis    DDD (degenerative disc disease), cervical    Chronic back pain greater than 3 months duration    Sacroiliitis       Schedule ATUL.    Problem List Items Addressed This Visit       Chronic back pain greater than 3 months duration    Sacroiliitis    Lumbar degenerative disc disease - Primary    Cervical radiculitis    DDD (degenerative disc disease), cervical         No future appointments.             There are no Patient Instructions on file for this visit.  No follow-ups on file.     Signature:  Beth Brooks MD      Date of encounter: 2/22/24

## 2024-02-22 NOTE — H&P (VIEW-ONLY)
Beth Brooks MD        PATIENT NAME: Marlon Franco  : 1975  DATE: 24  MRN: [id number]      Billing Provider: Beth Brooks MD  Level of Service:   Patient PCP Information       Provider PCP Type    Primary Doctor No General            Reason for Visit / Chief Complaint: Low-back Pain (Post op tapan SI joint 23  C/O pain level 5-6, Taking pain meds.states procedure helped some. )       Update PCP  Update Chief Complaint         History of Present Illness / Problem Focused Workflow     Marlon Franco presents to the clinic with Low-back Pain (Post op tapan SI joint 23  C/O pain level 5-6, Taking pain meds.states procedure helped some. )       This is a 48-year-old male who presents to clinic today for follow up of his chronic neck pain and lower back pain.  He underwent bilateral sacroiliac joint injections back in November of last year.  He states that he has gotten relief from that procedure.  He is complaining more of neck pain today.  His last cervical epidural steroid injection was then 2022.  He would like to have this repeated.      Back Pain  Associated symptoms include numbness.   Neck Pain   Associated symptoms include numbness.   Low-back Pain  Associated symptoms include numbness.       Review of Systems     Review of Systems   Musculoskeletal:  Positive for back pain, neck pain and neck stiffness.   Neurological:  Positive for dizziness and numbness.   All other systems reviewed and are negative.       Medical / Social / Family History     Past Medical History:   Diagnosis Date    Carpal tunnel syndrome of left wrist     Carpal tunnel syndrome of right wrist     Essential tremor     GERD (gastroesophageal reflux disease)     HTN (hypertension)     Low back pain     Lumbar radiculitis     Lumbar spinal stenosis     Piriformis syndrome     PTSD (post-traumatic stress disorder)     Sacroiliitis     Sleep apnea, unspecified        Past Surgical History:   Procedure  Laterality Date    ANKLE SURGERY Left     BUNIONECTOMY Left     EPIDURAL STEROID INJECTION INTO CERVICAL SPINE N/A 10/12/2022    Procedure: Injection-steroid-epidural-cervical;  Surgeon: Beth Brooks MD;  Location: [facility] OR;  Service: Pain Management;  Laterality: N/A;  C7-T1    FRACTURE SURGERY  1981    Broke left arm    INJECTION OF PIRIFORMIS MUSCLE Left 02/16/2022    INJECTION, SACROILIAC JOINT Left 02/16/2022    INJECTION, SACROILIAC JOINT Right 11/16/2022    Procedure: INJECTION,SACROILIAC JOINT;  Surgeon: Beth Brooks MD;  Location: [facility] OR;  Service: Pain Management;  Laterality: Right;  Right SIJ    INJECTION, SACROILIAC JOINT Left 07/12/2023    Procedure: INJECTION,SACROILIAC JOINT;  Surgeon: Beth Brooks MD;  Location: [facility] OR;  Service: Pain Management;  Laterality: Left;  Left SI Joint Injection    INJECTION, SACROILIAC JOINT Bilateral 11/22/2023    Procedure: INJECTION,SACROILIAC JOINT;  Surgeon: Beth Brooks MD;  Location: [facility] OR;  Service: Pain Management;  Laterality: Bilateral;  Bilateral SI Joint Injection    ORIF LEFT ARM      REPAIR OF SCROTUM      SURGICAL REMOVAL OF PILONIDAL CYST      TRANSFORAMINAL EPIDURAL INJECTION OF STEROID Left 02/24/2021    L3 & L4    TRIGGER POINT INJECTION  10/04/2021       Social History  Mr. Franco  reports that he has never smoked. He has never used smokeless tobacco. He reports that he does not currently use alcohol. He reports that he does not use drugs.    Family History  Mr.'s Franco family history includes Arthritis in his maternal aunt; Asthma in his father; Cancer in his mother; Heart disease in his father.    Medications and Allergies     Medications  Outpatient Medications Marked as Taking for the 2/22/24 encounter (Office Visit) with Beth Brooks MD   Medication Sig Dispense Refill    adalimumab (HUMIRA,CF,) 40 mg/0.4 mL SyKt Inject 40 mg into the skin every 14 (fourteen) days.      allopurinoL (ZYLOPRIM) 100 MG tablet Take 100 mg by  mouth once daily.      alprostadiL (EDEX) 20 mcg injection INJECT 20MCG/1VIL INTRACAVITY EVERY DAY AS NEEDED  FOR THE TREATMENT OF ERECTILE DYSFUNCTION *MAX OF 6 PER 90 DAYS SUPPLY*      buPROPion (WELLBUTRIN XL) 150 MG TB24 tablet Take 1 tablet by mouth once daily.      busPIRone (BUSPAR) 15 MG tablet Take 15 mg by mouth 2 (two) times daily.      carBAMazepine (TEGRETOL) 200 mg tablet Take 200 mg by mouth 2 (two) times daily.      citalopram (CELEXA) 40 MG tablet Take 40 mg by mouth once daily.      clonazePAM (KLONOPIN) 1 MG tablet Take 1 mg by mouth 3 (three) times daily as needed.      cyanocobalamin (VITAMIN B-12) 1000 MCG tablet Take 1 tablet by mouth once daily.      dicyclomine (BENTYL) 20 mg tablet Take 20 mg by mouth once daily.      divalproex (DEPAKOTE) 500 MG TbEC Take 500 mg by mouth nightly.      flecainide (TAMBOCOR) 50 MG Tab Take 50 mg by mouth 2 (two) times daily.      fluticasone propionate (FLONASE) 50 mcg/actuation nasal spray 1 spray by Each Nostril route as needed.      gabapentin (NEURONTIN) 400 MG capsule Take 400 mg by mouth 3 (three) times daily.      hydrALAZINE (APRESOLINE) 25 MG tablet Take 1 tablet by mouth once daily.      HYDROcodone-acetaminophen (NORCO)  mg per tablet Take 1 tablet by mouth every 6 (six) hours as needed.      HYDROcodone-acetaminophen  mg/15 mL Soln Take 1 tablet by mouth every 6 (six) hours as needed.      hydrOXYzine (ATARAX) 50 MG tablet Take 100 mg by mouth nightly.      LIDOcaine (LIDODERM) 5 % Place 1 patch onto the skin every 24 hours. Only prn      meloxicam (MOBIC) 7.5 MG tablet Take 7.5 mg by mouth once daily.      methocarbamoL (ROBAXIN) 750 MG Tab Take 1 tablet (750 mg total) by mouth 3 (three) times daily as needed (muscle spasms). (Patient taking differently: Take 500 mg by mouth 3 (three) times daily as needed (muscle spasms).) 50 tablet 3    midodrine (PROAMATINE) 5 MG Tab Take 5 mg by mouth Daily.      mirtazapine (REMERON) 45 MG  tablet Take 45 mg by mouth every evening.      naloxone (NARCAN) 4 mg/actuation Spry USE 1 SPRAY IN ONE NOSTRIL ONCE AS DIRECTED FOR OPIATE REVERSAL  FOR OPIOID OVERDOSE. DO NOT PRIME NASAL SPRAY. GIVE ADDITIONAL DOSE IF  PATIENT DOES NOT RESPOND WITHIN 2-3 MINUTES OR RESPONDS BUT STOPS  BREATHING AGAIN.  CALL 911.  IF USED, NOTIFY YOUR PROVIDER FOR OPIATE REVERSAL  FOR OPIOID OVERDOSE. DO NOT PRIME NASAL SPRAY. GIVE ADDITIONAL DOSE IF  PATIENT DOES NOT RESPOND WITHIN 2-3 MINUTES OR RESPONDS BUT STOPS  BREATHING AGAIN.  CALL 911.  IF USED, NOTIFY YOUR PROVIDER      nystatin (MYCOSTATIN) 100,000 unit/mL suspension TAKE 5 ML BY MOUTH FOUR TIMES A DAY (SWISH AND SWALLOW)      pantoprazole (PROTONIX) 40 MG tablet Take 40 mg by mouth once daily.      pravastatin (PRAVACHOL) 20 MG tablet Take 20 mg by mouth every evening.      prazosin (MINIPRESS) 2 MG Cap Take 4 mg by mouth every evening.      primidone (MYSOLINE) 50 MG Tab Take 50 mg by mouth 2 (two) times a day.      ramelteon (ROZEREM) 8 mg tablet Take 8 mg by mouth every evening. prn      senna (SENOKOT) 8.6 mg tablet Take 17.2 mg by mouth once daily. prn      tamsulosin (FLOMAX) 0.4 mg Cap 0.4 mg once daily.      testosterone cypionate (DEPOTESTOTERONE CYPIONATE) 200 mg/mL injection 200 mg every 14 (fourteen) days.      testosterone enanthate (DELATESTRYL) 200 mg/mL injection Inject 200 mg into the muscle every 14 (fourteen) days.      topiramate (TOPAMAX) 200 MG Tab Take 200 mg by mouth.      traZODone (DESYREL) 100 MG tablet Take 100 mg by mouth every evening.      venlafaxine (EFFEXOR) 75 MG tablet Take 75 mg by mouth once daily.      verapamiL (VERELAN) 180 MG C24P Take 180 mg by mouth nightly.      XIFAXAN 550 mg Tab Take 550 mg by mouth 3 (three) times daily.         Allergies  Review of patient's allergies indicates:  No Known Allergies    Physical Examination     Vitals:    02/22/24 1352   BP: (!) 146/100     Spine Musculoskeletal Exam    Gait    Gait is  normal.    Inspection    Cervical Spine    Cervical spine inspection is normal.    Palpation    Thoracolumbar    Tenderness: present      Piriformis: right      SI Joint: right    Range of Motion    Cervical Spine        Cervical spine range of motion additional comments: Restricted ROM in neck due to pain.    Strength    Cervical Spine    Cervical spine motor exam is normal.    Sensory    Cervical Spine    Cervical spine sensation is normal.    Special Tests    Thoracolumbar      Right      DENA test: positive    General      Constitutional: appears stated age, well-developed and well-nourished    Scleral icterus: no    Labored breathing: no    Psychiatric: normal mood and affect and no acute distress    Neurological: alert and oriented x3    Skin: intact    Lymphadenopathy: none     CV: extremities warm, well-perfused  Resp: nonlabored breathing    Assessment and Plan (including Health Maintenance)      Problem List  Smart Sets  Document Outside HM   :    Plan:   Lumbar degenerative disc disease    Cervical radiculitis    DDD (degenerative disc disease), cervical    Chronic back pain greater than 3 months duration    Sacroiliitis       Schedule ATUL.    Problem List Items Addressed This Visit       Chronic back pain greater than 3 months duration    Sacroiliitis    Lumbar degenerative disc disease - Primary    Cervical radiculitis    DDD (degenerative disc disease), cervical         No future appointments.             There are no Patient Instructions on file for this visit.  No follow-ups on file.     Signature:  Beth Brooks MD      Date of encounter: 2/22/24

## 2024-02-22 NOTE — LETTER
February 22, 2024       [facility]  [address], [address]  [facility] [address]  Phone: [phone]  Fax: [phone]       Patient: Marlon Franco   YOB: 1975  Date of Visit: 02/22/2024    To Whom It May Concern:    Nena Franco  was at [facility] on 02/22/2024. . If you have any questions or concerns, or if I can be of further assistance, please do not hesitate to contact me.    Sincerely,    Beth Brooks MD/mt

## 2024-03-21 ENCOUNTER — ANESTHESIA EVENT (OUTPATIENT)
Dept: SURGERY | Facility: HOSPITAL | Age: 49
End: 2024-03-21
Payer: OTHER GOVERNMENT

## 2024-03-21 ENCOUNTER — HOSPITAL ENCOUNTER (OUTPATIENT)
Facility: HOSPITAL | Age: 49
Discharge: HOME | End: 2024-03-21
Attending: ANESTHESIOLOGY | Admitting: ANESTHESIOLOGY
Payer: OTHER GOVERNMENT

## 2024-03-21 ENCOUNTER — ANESTHESIA (OUTPATIENT)
Dept: SURGERY | Facility: HOSPITAL | Age: 49
End: 2024-03-21
Payer: OTHER GOVERNMENT

## 2024-03-21 DIAGNOSIS — M54.2 CHRONIC NECK PAIN: ICD-10-CM

## 2024-03-21 DIAGNOSIS — G89.29 CHRONIC NECK PAIN: ICD-10-CM

## 2024-03-21 PROCEDURE — 25000003 PHARM REV CODE 250: Performed by: ANESTHESIOLOGY

## 2024-03-21 PROCEDURE — D9220A PRA ANESTHESIA: Mod: ANES,,, | Performed by: STUDENT IN AN ORGANIZED HEALTH CARE EDUCATION/TRAINING PROGRAM

## 2024-03-21 PROCEDURE — 63600175 PHARM REV CODE 636 W HCPCS: Performed by: ANESTHESIOLOGY

## 2024-03-21 PROCEDURE — D9220A PRA ANESTHESIA: Mod: CRNA,,, | Performed by: NURSE ANESTHETIST, CERTIFIED REGISTERED

## 2024-03-21 PROCEDURE — A4216 STERILE WATER/SALINE, 10 ML: HCPCS | Performed by: ANESTHESIOLOGY

## 2024-03-21 PROCEDURE — 62321 NJX INTERLAMINAR CRV/THRC: CPT | Performed by: ANESTHESIOLOGY

## 2024-03-21 PROCEDURE — 62321 NJX INTERLAMINAR CRV/THRC: CPT | Mod: ,,, | Performed by: ANESTHESIOLOGY

## 2024-03-21 PROCEDURE — 37000009 HC ANESTHESIA EA ADD 15 MINS: Performed by: ANESTHESIOLOGY

## 2024-03-21 PROCEDURE — 25000003 PHARM REV CODE 250: Performed by: NURSE ANESTHETIST, CERTIFIED REGISTERED

## 2024-03-21 PROCEDURE — 37000008 HC ANESTHESIA 1ST 15 MINUTES: Performed by: ANESTHESIOLOGY

## 2024-03-21 PROCEDURE — 63600175 PHARM REV CODE 636 W HCPCS: Performed by: NURSE ANESTHETIST, CERTIFIED REGISTERED

## 2024-03-21 RX ORDER — DEXAMETHASONE SODIUM PHOSPHATE 10 MG/ML
INJECTION INTRAMUSCULAR; INTRAVENOUS
Status: DISCONTINUED | OUTPATIENT
Start: 2024-03-21 | End: 2024-03-21 | Stop reason: HOSPADM

## 2024-03-21 RX ORDER — LIDOCAINE HYDROCHLORIDE 10 MG/ML
INJECTION INFILTRATION; PERINEURAL
Status: DISCONTINUED
Start: 2024-03-21 | End: 2024-03-21 | Stop reason: HOSPADM

## 2024-03-21 RX ORDER — SODIUM CHLORIDE 0.9 % (FLUSH) 0.9 %
SYRINGE (ML) INJECTION
Status: DISCONTINUED | OUTPATIENT
Start: 2024-03-21 | End: 2024-03-21 | Stop reason: HOSPADM

## 2024-03-21 RX ORDER — LIDOCAINE HYDROCHLORIDE 10 MG/ML
INJECTION, SOLUTION EPIDURAL; INFILTRATION; INTRACAUDAL; PERINEURAL
Status: DISCONTINUED | OUTPATIENT
Start: 2024-03-21 | End: 2024-03-21

## 2024-03-21 RX ORDER — LIDOCAINE HYDROCHLORIDE 10 MG/ML
INJECTION, SOLUTION EPIDURAL; INFILTRATION; INTRACAUDAL; PERINEURAL
Status: DISCONTINUED | OUTPATIENT
Start: 2024-03-21 | End: 2024-03-21 | Stop reason: HOSPADM

## 2024-03-21 RX ORDER — DEXAMETHASONE SODIUM PHOSPHATE 10 MG/ML
INJECTION INTRAMUSCULAR; INTRAVENOUS
Status: DISCONTINUED
Start: 2024-03-21 | End: 2024-03-21 | Stop reason: HOSPADM

## 2024-03-21 RX ORDER — PROPOFOL 10 MG/ML
VIAL (ML) INTRAVENOUS
Status: DISCONTINUED | OUTPATIENT
Start: 2024-03-21 | End: 2024-03-21

## 2024-03-21 RX ADMIN — LIDOCAINE HYDROCHLORIDE 20 MG: 10 INJECTION, SOLUTION EPIDURAL; INFILTRATION; INTRACAUDAL; PERINEURAL at 12:03

## 2024-03-21 RX ADMIN — Medication 120 MG: at 12:03

## 2024-03-21 RX ADMIN — SODIUM CHLORIDE, POTASSIUM CHLORIDE, SODIUM LACTATE AND CALCIUM CHLORIDE: 600; 310; 30; 20 INJECTION, SOLUTION INTRAVENOUS at 12:03

## 2024-03-21 RX ADMIN — Medication 40 MG: at 12:03

## 2024-03-21 NOTE — TRANSFER OF CARE
Anesthesia Transfer of Care Note    Patient: Marlon Franco    Procedure(s) Performed: Procedure(s) (LRB):  INJECTION, STEROID, SPINE, CERVICAL, EPIDURAL C7-T1 (N/A)    Patient location: PACU    Anesthesia Type: MAC    Transport from OR: Transported from OR on room air with adequate spontaneous ventilation    Post pain: adequate analgesia    Post assessment: no apparent anesthetic complications    Post vital signs: stable    Level of consciousness: awake    Nausea/Vomiting: no nausea/vomiting    Complications: none    Transfer of care protocol was followed

## 2024-03-21 NOTE — ANESTHESIA PREPROCEDURE EVALUATION
03/21/2024  Marlon Franco is a 48 y.o., male.    Low back pain    Other Medical History   Carpal tunnel syndrome of left wrist Carpal tunnel syndrome of right wrist   Essential tremor GERD (gastroesophageal reflux disease)   HTN (hypertension) Lumbar radiculitis   Lumbar spinal stenosis Piriformis syndrome   PTSD (post-traumatic stress disorder) Sacroiliitis   Sleep apnea, unspecified      Surgical History  INJECTION OF PIRIFORMIS MUSCLE TRIGGER POINT INJECTION   TRANSFORAMINAL EPIDURAL INJECTION OF STEROID INJECTION, SACROILIAC JOINT   BUNIONECTOMY ANKLE SURGERY   ORIF LEFT ARM SURGICAL REMOVAL OF PILONIDAL CYST   REPAIR OF SCROTUM EPIDURAL STEROID INJECTION INTO CERVICAL SPINE   FRACTURE SURGERY INJECTION, SACROILIAC JOINT   INJECTION, SACROILIAC JOINT INJECTION, SACROILIAC JOINT     Pre-op Assessment    I have reviewed the Patient Summary Reports.     I have reviewed the Nursing Notes. I have reviewed the NPO Status.   I have reviewed the Medications.     Review of Systems  Anesthesia Hx:  No problems with previous Anesthesia                Social:  Non-Smoker       Hematology/Oncology:  Hematology Normal   Oncology Normal                                   EENT/Dental:  EENT/Dental Normal           Cardiovascular:  Exercise tolerance: good   Hypertension                Functional Capacity good / => 4 METS                         Pulmonary:        Sleep Apnea                Renal/:  Renal/ Normal                 Hepatic/GI:     GERD             Musculoskeletal:  Arthritis               Neurological:    Neuromuscular Disease,                                   Endocrine:  Endocrine Normal          Obesity / BMI > 30  Dermatological:  Skin Normal    Psych:  Psychiatric History                  Physical Exam  General: Well nourished, Cooperative, Alert and Oriented    Airway:  Mallampati: II   Mouth  Opening: Normal  TM Distance: Normal  Tongue: Normal  Neck ROM: Normal ROM    Dental:  Intact        Anesthesia Plan  Type of Anesthesia, risks & benefits discussed:    Anesthesia Type: Gen Natural Airway  Intra-op Monitoring Plan: Standard ASA Monitors  Post Op Pain Control Plan: IV/PO Opioids PRN  (medical reason for not using multimodal pain management)  Induction:  IV  Informed Consent: Informed consent signed with the Patient and all parties understand the risks and agree with anesthesia plan.  All questions answered.   ASA Score: 2  Day of Surgery Review of History & Physical: H&P Update referred to the surgeon/provider.    Ready For Surgery From Anesthesia Perspective.     .

## 2024-03-21 NOTE — DISCHARGE SUMMARY
[facility] - Periop Services  Discharge Note  Short Stay    Procedure(s) (LRB):  INJECTION, STEROID, SPINE, CERVICAL, EPIDURAL C7-T1 (N/A)      OUTCOME: Patient tolerated treatment/procedure well without complication and is now ready for discharge.    DISPOSITION: Home or Self Care    FINAL DIAGNOSIS:  <principal problem not specified>    FOLLOWUP: In clinic    DISCHARGE INSTRUCTIONS:  No discharge procedures on file.     TIME SPENT ON DISCHARGE: 5 minutes

## 2024-03-21 NOTE — ANESTHESIA POSTPROCEDURE EVALUATION
Anesthesia Post Evaluation    Patient: Marlon Franco    Procedure(s) Performed: Procedure(s) (LRB):  INJECTION, STEROID, SPINE, CERVICAL, EPIDURAL C7-T1 (N/A)    Final Anesthesia Type: MAC      Patient location during evaluation: PACU  Patient participation: Yes- Able to Participate  Level of consciousness: awake and alert  Post-procedure vital signs: reviewed and stable  Pain management: adequate  Airway patency: patent    PONV status at discharge: No PONV  Anesthetic complications: no      Cardiovascular status: blood pressure returned to baseline and stable  Respiratory status: unassisted  Hydration status: euvolemic  Follow-up not needed.                  No case tracking events are documented in the log.      Pain/Amos Score: No data recorded

## 2024-03-22 VITALS
HEIGHT: 72 IN | DIASTOLIC BLOOD PRESSURE: 95 MMHG | TEMPERATURE: 98 F | OXYGEN SATURATION: 99 % | SYSTOLIC BLOOD PRESSURE: 140 MMHG | WEIGHT: 237.63 LBS | HEART RATE: 78 BPM | RESPIRATION RATE: 18 BRPM | BODY MASS INDEX: 32.19 KG/M2

## 2024-04-08 ENCOUNTER — OFFICE VISIT (OUTPATIENT)
Dept: PAIN MEDICINE | Facility: CLINIC | Age: 49
End: 2024-04-08
Payer: OTHER GOVERNMENT

## 2024-04-08 VITALS
SYSTOLIC BLOOD PRESSURE: 152 MMHG | BODY MASS INDEX: 32.1 KG/M2 | HEIGHT: 72 IN | DIASTOLIC BLOOD PRESSURE: 106 MMHG | WEIGHT: 237 LBS

## 2024-04-08 DIAGNOSIS — M53.3 SACROILIAC JOINT PAIN: ICD-10-CM

## 2024-04-08 DIAGNOSIS — M54.12 CERVICAL RADICULITIS: ICD-10-CM

## 2024-04-08 DIAGNOSIS — M46.1 SACROILIITIS: Primary | ICD-10-CM

## 2024-04-08 DIAGNOSIS — S92.302D CLOSED AVULSION FRACTURE OF METATARSAL BONE OF LEFT FOOT WITH ROUTINE HEALING, SUBSEQUENT ENCOUNTER: ICD-10-CM

## 2024-04-08 DIAGNOSIS — M50.30 DDD (DEGENERATIVE DISC DISEASE), CERVICAL: ICD-10-CM

## 2024-04-08 PROCEDURE — 99214 OFFICE O/P EST MOD 30 MIN: CPT | Mod: ,,, | Performed by: NURSE PRACTITIONER

## 2024-04-08 RX ORDER — ANASTROZOLE 1 MG/1
1 TABLET ORAL WEEKLY
COMMUNITY
Start: 2023-11-10

## 2024-04-08 RX ORDER — MOMETASONE FUROATE 1 MG/G
CREAM TOPICAL
COMMUNITY

## 2024-04-08 RX ORDER — PROMETHAZINE HYDROCHLORIDE AND DEXTROMETHORPHAN HYDROBROMIDE 6.25; 15 MG/5ML; MG/5ML
SYRUP ORAL
COMMUNITY

## 2024-04-08 RX ORDER — PRAVASTATIN SODIUM 80 MG/1
80 TABLET ORAL NIGHTLY
COMMUNITY
Start: 2023-10-25

## 2024-04-08 NOTE — H&P (VIEW-ONLY)
Subjective:      Patient ID: Marlon Franco is a 48 y.o. male.    Chief Complaint: Cervical Spine Pain (C-spine) (Post op procedure 3/21/24 C/O pain level 4, Taking pain meds, states procedure helped for 1 day.)    Referred by: Lyndsay Aguayo He*     HPI:  This is a pleasant 48-year-old male presents as a follow-up for pain associated with cervical degenerative disc disease and radiculopathy after receiving a C7-T1 interlaminar cervical epidural steroid injection on 03/21/2024.  Unfortunately patient reported receiving pain relief for 1 day.  His current pain score is 4/10.  Prior to this injection , latasha stated in the past he was evaluated by Dr. Johnson who apparently recommended a cervical spine surgery; however, he was not sure about doing that during his last office visit.  He does plan to follow back up with Dr. Johnson for treatment options as this pain is really wearing him down.  After the 1 day of pain relief to his neck and arms now he has posterior neck pain radiating down both arms and hands he reports his right hand will go numb mowing grass with the 0 turned lawn more.  He also has severe pain with prolonged driving and insomnia as the pain will wake him up.  All these activities that involve his neck and arms will elevate his pain score to a 10/10.  He currently takes the following Pain medications Norco 10/325 as needed, meloxicam, gabapentin 400 mg by mouth 3 times a day, Lidoderm patch 12 hours on 12 hours off.    His pain is more pronounced today to bilateral buttock region reports this is worse with prolonged standing, yd work prolonged sitting as well as lying down.  He has known sacroiliitis with prior SI joint injections.  His last bilateral SI joint injections were completed in November of 2023.. In the past he has been evaluated by Dr. Johnson who apparently recommended a cervical spine surgery however he was not sure about doing this just yet.  Overall he rates his pain level  today as4/10.    Vital signs:   Vitals:    04/08/24 1256 04/08/24 1258   BP: (!) 152/106    Weight: 107.5 kg (237 lb)    Height: 6' (1.829 m)    PainSc:    4     Body mass index is 32.14 kg/m².  Pain Disability Index (PDI): 54       Interventional Pain History  03/21/2024:  C7-T1 ATUL  11/22/2023:  Bilateral SI joint injections    ROS:  Neck pain      Cervical MRI 2022  (located in  on 09/06/2022)  Objective:          Physical Exam  General: Well developed; overweight; A&O x 3; No anxiety/depression; NAD  Mental Status: Oriented to person, palce and time. Displays appropriate mood & affect.  Head: Norm cephalic and atraumatic  Neck:   bilateral cervical paraspinal banding.  Full range of motion with lateral turning and cervical flexion +extension.Able to fully extend arms above head with equal hand  bilaterally   Eyes: normal conjunctiva, normal lids, normal pupils  ENT and mouth: normal external ear, nose, and no lesions noted on the lips.  Respiratory: Symmetrical, Unlabored. No dyspnea  CV: normal rhythm and rate. No peripheral edema.   Abdomen: Non-distended    Extremities:  Gen: No cyanosis or tenderness to palpation bilateral upper and lower extremities  Skin: Warm, pink, dry, no rashes, no lesions on the lumbar spine  Strength: 5/5 motor strength bilateral upper and lower extremities  ROM: Full ROM in bilateral knees and ankles without pain or instability.    Neuro:  Gait: no altalgic lean, normal toe and heel raise. Independent ambulator.  DTR's: 2+ in bilateral patellar, and ankle  Sensory: Intact to light touch bilateral  upper and lower extremities    Spine: Normal lordosis. No scoliosis  L-spine ROM: Limited and painful ROM to flexion, extension, bilateral rotation,   Straight Leg Raise:  neg bilaterally   SI Joint: + tenderness to palpation bilaterally. + fabers bilaterally             Assessment:     This is a pleasant 48-year-old male presents as a follow-up for pain associated with  cervical degenerative disc disease and radiculopathy after receiving a C7-T1 interlaminar cervical epidural steroid injection on 03/21/2024.  Unfortunately patient reported receiving pain relief for 1 day.  His current pain score is 4/10.  Prior to this injection ,  stated in the past he was evaluated by Dr. Johnson who apparently recommended a cervical spine surgery; however, he was not sure about doing that during his last office visit.  He does plan to follow back up with Dr. Johnson for treatment options as this pain is really wearing him down.  After the 1 day of pain relief to his neck and arms now he has posterior neck pain radiating down both arms and hands he reports his right hand will go numb mowing grass with the 0 turned lawn more.  He also has severe pain with prolonged driving and insomnia as the pain will wake him up.  All these activities that involve his neck and arms will elevate his pain score to a 10/10.  He currently takes the following Pain medications Norco 10/325 as needed, meloxicam, gabapentin 400 mg by mouth 3 times a day, Lidoderm patch 12 hours on 12 hours off.    His pain is more pronounced today to bilateral buttock region reports this is worse with prolonged standing, yd work prolonged sitting as well as lying down.  He has known sacroiliitis with prior SI joint injections.  His last bilateral SI joint injections were completed in November of 2023.. In the past he has been evaluated by Dr. Johnson who apparently recommended a cervical spine surgery however he was not sure about doing this just yet.  Overall he rates his pain level today as4/10.      Plan of care:  Bilateral SI joint injection requested  Hold all NSAIDS  and Meloxicam 7 days prior  Patient to follow up with Neurosurgeon, Dr. Johnson  Follow up post op.       Encounter Diagnoses   Name Primary?    DDD (degenerative disc disease), cervical Yes    Cervical radiculitis     Sacroiliitis          Plan:       Marlon was  seen today for cervical spine pain (c-spine).    Diagnoses and all orders for this visit:    DDD (degenerative disc disease), cervical    Cervical radiculitis    Sacroiliitis               Past Medical History:   Diagnosis Date    Carpal tunnel syndrome of left wrist     Carpal tunnel syndrome of right wrist     Essential tremor     GERD (gastroesophageal reflux disease)     HTN (hypertension)     Low back pain     Lumbar radiculitis     Lumbar spinal stenosis     Piriformis syndrome     PTSD (post-traumatic stress disorder)     Sacroiliitis     Sleep apnea, unspecified     uses cpap       Past Surgical History:   Procedure Laterality Date    ANKLE SURGERY Left     BUNIONECTOMY Left     EPIDURAL STEROID INJECTION INTO CERVICAL SPINE N/A 10/12/2022    Procedure: Injection-steroid-epidural-cervical;  Surgeon: Beth Brooks MD;  Location: [facility] OR;  Service: Pain Management;  Laterality: N/A;  C7-T1    EPIDURAL STEROID INJECTION INTO CERVICAL SPINE N/A 3/21/2024    Procedure: INJECTION, STEROID, SPINE, CERVICAL, EPIDURAL C7-T1;  Surgeon: Beth Brooks MD;  Location: [facility] OR;  Service: Pain Management;  Laterality: N/A;    FRACTURE SURGERY  1981    Broke left arm    INJECTION OF PIRIFORMIS MUSCLE Left 02/16/2022    INJECTION, SACROILIAC JOINT Left 02/16/2022    INJECTION, SACROILIAC JOINT Right 11/16/2022    Procedure: INJECTION,SACROILIAC JOINT;  Surgeon: Beth Brooks MD;  Location: [facility] OR;  Service: Pain Management;  Laterality: Right;  Right SIJ    INJECTION, SACROILIAC JOINT Left 07/12/2023    Procedure: INJECTION,SACROILIAC JOINT;  Surgeon: Beth Brooks MD;  Location: [facility] OR;  Service: Pain Management;  Laterality: Left;  Left SI Joint Injection    INJECTION, SACROILIAC JOINT Bilateral 11/22/2023    Procedure: INJECTION,SACROILIAC JOINT;  Surgeon: Beth Brooks MD;  Location: [facility] OR;  Service: Pain Management;  Laterality: Bilateral;  Bilateral SI Joint Injection    ORIF LEFT ARM      REPAIR OF  SCROTUM      SURGICAL REMOVAL OF PILONIDAL CYST      TRANSFORAMINAL EPIDURAL INJECTION OF STEROID Left 02/24/2021    L3 & L4    TRIGGER POINT INJECTION  10/04/2021       Family History   Problem Relation Age of Onset    Cancer Mother         Colon cancer    Heart disease Father         Heart disease in family    Asthma Father     Arthritis Maternal Aunt         Family has arthritis in family       Social History     Socioeconomic History    Marital status: Single   Tobacco Use    Smoking status: Never    Smokeless tobacco: Never   Substance and Sexual Activity    Alcohol use: Not Currently    Drug use: Never    Sexual activity: Not Currently       Current Outpatient Medications   Medication Sig Dispense Refill    adalimumab (HUMIRA,CF,) 40 mg/0.4 mL SyKt Inject 40 mg into the skin every 14 (fourteen) days.      allopurinoL (ZYLOPRIM) 100 MG tablet Take 100 mg by mouth once daily.      alprostadiL (EDEX) 20 mcg injection INJECT 20MCG/1VIL INTRACAVITY EVERY DAY AS NEEDED  FOR THE TREATMENT OF ERECTILE DYSFUNCTION *MAX OF 6 PER 90 DAYS SUPPLY*      anastrozole (ARIMIDEX) 1 mg Tab Take 1 tablet by mouth once a week.      busPIRone (BUSPAR) 15 MG tablet Take 15 mg by mouth 2 (two) times daily.      clonazePAM (KLONOPIN) 1 MG tablet Take 1 mg by mouth 3 (three) times daily as needed.      cyanocobalamin (VITAMIN B-12) 1000 MCG tablet Take 1 tablet by mouth once daily.      dicyclomine (BENTYL) 20 mg tablet Take 20 mg by mouth once daily.      divalproex (DEPAKOTE) 500 MG TbEC Take 500 mg by mouth nightly.      flecainide (TAMBOCOR) 50 MG Tab Take 50 mg by mouth 2 (two) times daily.      fluticasone propionate (FLONASE) 50 mcg/actuation nasal spray 1 spray by Each Nostril route as needed.      gabapentin (NEURONTIN) 400 MG capsule Take 400 mg by mouth 3 (three) times daily.      hydrALAZINE (APRESOLINE) 25 MG tablet Take 1 tablet by mouth once daily.      HYDROcodone-acetaminophen (NORCO)  mg per tablet Take 1 tablet  by mouth every 6 (six) hours as needed.      HYDROcodone-acetaminophen  mg/15 mL Soln Take 1 tablet by mouth every 6 (six) hours as needed.      hydrOXYzine (ATARAX) 50 MG tablet Take 100 mg by mouth nightly.      LIDOcaine (LIDODERM) 5 % Place 1 patch onto the skin every 24 hours. Only prn      meloxicam (MOBIC) 7.5 MG tablet Take 7.5 mg by mouth once daily.      methocarbamoL (ROBAXIN) 750 MG Tab Take 1 tablet (750 mg total) by mouth 3 (three) times daily as needed (muscle spasms). (Patient taking differently: Take 500 mg by mouth 3 (three) times daily as needed (muscle spasms).) 50 tablet 3    midodrine (PROAMATINE) 5 MG Tab Take 5 mg by mouth Daily.      mirtazapine (REMERON) 45 MG tablet Take 45 mg by mouth every evening.      mometasone 0.1% (ELOCON) 0.1 % cream       naloxone (NARCAN) 4 mg/actuation Spry USE 1 SPRAY IN ONE NOSTRIL ONCE AS DIRECTED FOR OPIATE REVERSAL  FOR OPIOID OVERDOSE. DO NOT PRIME NASAL SPRAY. GIVE ADDITIONAL DOSE IF  PATIENT DOES NOT RESPOND WITHIN 2-3 MINUTES OR RESPONDS BUT STOPS  BREATHING AGAIN.  CALL 911.  IF USED, NOTIFY YOUR PROVIDER FOR OPIATE REVERSAL  FOR OPIOID OVERDOSE. DO NOT PRIME NASAL SPRAY. GIVE ADDITIONAL DOSE IF  PATIENT DOES NOT RESPOND WITHIN 2-3 MINUTES OR RESPONDS BUT STOPS  BREATHING AGAIN.  CALL 911.  IF USED, NOTIFY YOUR PROVIDER      nystatin (MYCOSTATIN) 100,000 unit/mL suspension TAKE 5 ML BY MOUTH FOUR TIMES A DAY (SWISH AND SWALLOW)      pantoprazole (PROTONIX) 40 MG tablet Take 40 mg by mouth once daily.      pravastatin (PRAVACHOL) 80 MG tablet 40 mg.      prazosin (MINIPRESS) 2 MG Cap Take 4 mg by mouth every evening.      primidone (MYSOLINE) 50 MG Tab Take 50 mg by mouth 2 (two) times a day.      promethazine-dextromethorphan (PROMETHAZINE-DM) 6.25-15 mg/5 mL Syrp       ramelteon (ROZEREM) 8 mg tablet Take 8 mg by mouth every evening. prn      senna (SENOKOT) 8.6 mg tablet Take 17.2 mg by mouth once daily. prn      tamsulosin (FLOMAX) 0.4 mg Cap  0.4 mg once daily.      testosterone cypionate (DEPOTESTOTERONE CYPIONATE) 200 mg/mL injection 200 mg every 14 (fourteen) days.      testosterone enanthate (DELATESTRYL) 200 mg/mL injection Inject 200 mg into the muscle every 14 (fourteen) days.      topiramate (TOPAMAX) 200 MG Tab Take 200 mg by mouth.      traZODone (DESYREL) 100 MG tablet Take 100 mg by mouth every evening.      venlafaxine (EFFEXOR) 75 MG tablet Take 75 mg by mouth once daily.      verapamiL (VERELAN) 180 MG C24P Take 180 mg by mouth nightly.      XIFAXAN 550 mg Tab Take 550 mg by mouth 3 (three) times daily.       No current facility-administered medications for this visit.       Review of patient's allergies indicates:  No Known Allergies

## 2024-04-08 NOTE — PROGRESS NOTES
Subjective:      Patient ID: Marlon Franco is a 48 y.o. male.    Chief Complaint: Cervical Spine Pain (C-spine) (Post op procedure 3/21/24 C/O pain level 4, Taking pain meds, states procedure helped for 1 day.)    Referred by: Lyndsay Aguayo He*     HPI:  This is a pleasant 48-year-old male presents as a follow-up for pain associated with cervical degenerative disc disease and radiculopathy after receiving a C7-T1 interlaminar cervical epidural steroid injection on 03/21/2024.  Unfortunately patient reported receiving pain relief for 1 day.  His current pain score is 4/10.  Prior to this injection , latasha stated in the past he was evaluated by Dr. Johnson who apparently recommended a cervical spine surgery; however, he was not sure about doing that during his last office visit.  He does plan to follow back up with Dr. Johnson for treatment options as this pain is really wearing him down.  After the 1 day of pain relief to his neck and arms now he has posterior neck pain radiating down both arms and hands he reports his right hand will go numb mowing grass with the 0 turned lawn more.  He also has severe pain with prolonged driving and insomnia as the pain will wake him up.  All these activities that involve his neck and arms will elevate his pain score to a 10/10.  He currently takes the following Pain medications Norco 10/325 as needed, meloxicam, gabapentin 400 mg by mouth 3 times a day, Lidoderm patch 12 hours on 12 hours off.    His pain is more pronounced today to bilateral buttock region reports this is worse with prolonged standing, yd work prolonged sitting as well as lying down.  He has known sacroiliitis with prior SI joint injections.  His last bilateral SI joint injections were completed in November of 2023.. In the past he has been evaluated by Dr. Johnson who apparently recommended a cervical spine surgery however he was not sure about doing this just yet.  Overall he rates his pain level  today as4/10.    Vital signs:   Vitals:    04/08/24 1256 04/08/24 1258   BP: (!) 152/106    Weight: 107.5 kg (237 lb)    Height: 6' (1.829 m)    PainSc:    4     Body mass index is 32.14 kg/m².  Pain Disability Index (PDI): 54       Interventional Pain History  03/21/2024:  C7-T1 ATUL  11/22/2023:  Bilateral SI joint injections    ROS:  Neck pain      Cervical MRI 2022  (located in  on 09/06/2022)  Objective:          Physical Exam  General: Well developed; overweight; A&O x 3; No anxiety/depression; NAD  Mental Status: Oriented to person, palce and time. Displays appropriate mood & affect.  Head: Norm cephalic and atraumatic  Neck:   bilateral cervical paraspinal banding.  Full range of motion with lateral turning and cervical flexion +extension.Able to fully extend arms above head with equal hand  bilaterally   Eyes: normal conjunctiva, normal lids, normal pupils  ENT and mouth: normal external ear, nose, and no lesions noted on the lips.  Respiratory: Symmetrical, Unlabored. No dyspnea  CV: normal rhythm and rate. No peripheral edema.   Abdomen: Non-distended    Extremities:  Gen: No cyanosis or tenderness to palpation bilateral upper and lower extremities  Skin: Warm, pink, dry, no rashes, no lesions on the lumbar spine  Strength: 5/5 motor strength bilateral upper and lower extremities  ROM: Full ROM in bilateral knees and ankles without pain or instability.    Neuro:  Gait: no altalgic lean, normal toe and heel raise. Independent ambulator.  DTR's: 2+ in bilateral patellar, and ankle  Sensory: Intact to light touch bilateral  upper and lower extremities    Spine: Normal lordosis. No scoliosis  L-spine ROM: Limited and painful ROM to flexion, extension, bilateral rotation,   Straight Leg Raise:  neg bilaterally   SI Joint: + tenderness to palpation bilaterally. + fabers bilaterally             Assessment:     This is a pleasant 48-year-old male presents as a follow-up for pain associated with  cervical degenerative disc disease and radiculopathy after receiving a C7-T1 interlaminar cervical epidural steroid injection on 03/21/2024.  Unfortunately patient reported receiving pain relief for 1 day.  His current pain score is 4/10.  Prior to this injection ,  stated in the past he was evaluated by Dr. Johnson who apparently recommended a cervical spine surgery; however, he was not sure about doing that during his last office visit.  He does plan to follow back up with Dr. Johnson for treatment options as this pain is really wearing him down.  After the 1 day of pain relief to his neck and arms now he has posterior neck pain radiating down both arms and hands he reports his right hand will go numb mowing grass with the 0 turned lawn more.  He also has severe pain with prolonged driving and insomnia as the pain will wake him up.  All these activities that involve his neck and arms will elevate his pain score to a 10/10.  He currently takes the following Pain medications Norco 10/325 as needed, meloxicam, gabapentin 400 mg by mouth 3 times a day, Lidoderm patch 12 hours on 12 hours off.    His pain is more pronounced today to bilateral buttock region reports this is worse with prolonged standing, yd work prolonged sitting as well as lying down.  He has known sacroiliitis with prior SI joint injections.  His last bilateral SI joint injections were completed in November of 2023.. In the past he has been evaluated by Dr. Johnson who apparently recommended a cervical spine surgery however he was not sure about doing this just yet.  Overall he rates his pain level today as4/10.      Plan of care:  Bilateral SI joint injection requested  Hold all NSAIDS  and Meloxicam 7 days prior  Patient to follow up with Neurosurgeon, Dr. Johnson  Follow up post op.       Encounter Diagnoses   Name Primary?    DDD (degenerative disc disease), cervical Yes    Cervical radiculitis     Sacroiliitis          Plan:       Marlon was  seen today for cervical spine pain (c-spine).    Diagnoses and all orders for this visit:    DDD (degenerative disc disease), cervical    Cervical radiculitis    Sacroiliitis               Past Medical History:   Diagnosis Date    Carpal tunnel syndrome of left wrist     Carpal tunnel syndrome of right wrist     Essential tremor     GERD (gastroesophageal reflux disease)     HTN (hypertension)     Low back pain     Lumbar radiculitis     Lumbar spinal stenosis     Piriformis syndrome     PTSD (post-traumatic stress disorder)     Sacroiliitis     Sleep apnea, unspecified     uses cpap       Past Surgical History:   Procedure Laterality Date    ANKLE SURGERY Left     BUNIONECTOMY Left     EPIDURAL STEROID INJECTION INTO CERVICAL SPINE N/A 10/12/2022    Procedure: Injection-steroid-epidural-cervical;  Surgeon: Beth Brooks MD;  Location: [facility] OR;  Service: Pain Management;  Laterality: N/A;  C7-T1    EPIDURAL STEROID INJECTION INTO CERVICAL SPINE N/A 3/21/2024    Procedure: INJECTION, STEROID, SPINE, CERVICAL, EPIDURAL C7-T1;  Surgeon: Beth Brooks MD;  Location: [facility] OR;  Service: Pain Management;  Laterality: N/A;    FRACTURE SURGERY  1981    Broke left arm    INJECTION OF PIRIFORMIS MUSCLE Left 02/16/2022    INJECTION, SACROILIAC JOINT Left 02/16/2022    INJECTION, SACROILIAC JOINT Right 11/16/2022    Procedure: INJECTION,SACROILIAC JOINT;  Surgeon: Beth Brooks MD;  Location: [facility] OR;  Service: Pain Management;  Laterality: Right;  Right SIJ    INJECTION, SACROILIAC JOINT Left 07/12/2023    Procedure: INJECTION,SACROILIAC JOINT;  Surgeon: Beth Brooks MD;  Location: [facility] OR;  Service: Pain Management;  Laterality: Left;  Left SI Joint Injection    INJECTION, SACROILIAC JOINT Bilateral 11/22/2023    Procedure: INJECTION,SACROILIAC JOINT;  Surgeon: Beth Brooks MD;  Location: [facility] OR;  Service: Pain Management;  Laterality: Bilateral;  Bilateral SI Joint Injection    ORIF LEFT ARM      REPAIR OF  SCROTUM      SURGICAL REMOVAL OF PILONIDAL CYST      TRANSFORAMINAL EPIDURAL INJECTION OF STEROID Left 02/24/2021    L3 & L4    TRIGGER POINT INJECTION  10/04/2021       Family History   Problem Relation Age of Onset    Cancer Mother         Colon cancer    Heart disease Father         Heart disease in family    Asthma Father     Arthritis Maternal Aunt         Family has arthritis in family       Social History     Socioeconomic History    Marital status: Single   Tobacco Use    Smoking status: Never    Smokeless tobacco: Never   Substance and Sexual Activity    Alcohol use: Not Currently    Drug use: Never    Sexual activity: Not Currently       Current Outpatient Medications   Medication Sig Dispense Refill    adalimumab (HUMIRA,CF,) 40 mg/0.4 mL SyKt Inject 40 mg into the skin every 14 (fourteen) days.      allopurinoL (ZYLOPRIM) 100 MG tablet Take 100 mg by mouth once daily.      alprostadiL (EDEX) 20 mcg injection INJECT 20MCG/1VIL INTRACAVITY EVERY DAY AS NEEDED  FOR THE TREATMENT OF ERECTILE DYSFUNCTION *MAX OF 6 PER 90 DAYS SUPPLY*      anastrozole (ARIMIDEX) 1 mg Tab Take 1 tablet by mouth once a week.      busPIRone (BUSPAR) 15 MG tablet Take 15 mg by mouth 2 (two) times daily.      clonazePAM (KLONOPIN) 1 MG tablet Take 1 mg by mouth 3 (three) times daily as needed.      cyanocobalamin (VITAMIN B-12) 1000 MCG tablet Take 1 tablet by mouth once daily.      dicyclomine (BENTYL) 20 mg tablet Take 20 mg by mouth once daily.      divalproex (DEPAKOTE) 500 MG TbEC Take 500 mg by mouth nightly.      flecainide (TAMBOCOR) 50 MG Tab Take 50 mg by mouth 2 (two) times daily.      fluticasone propionate (FLONASE) 50 mcg/actuation nasal spray 1 spray by Each Nostril route as needed.      gabapentin (NEURONTIN) 400 MG capsule Take 400 mg by mouth 3 (three) times daily.      hydrALAZINE (APRESOLINE) 25 MG tablet Take 1 tablet by mouth once daily.      HYDROcodone-acetaminophen (NORCO)  mg per tablet Take 1 tablet  by mouth every 6 (six) hours as needed.      HYDROcodone-acetaminophen  mg/15 mL Soln Take 1 tablet by mouth every 6 (six) hours as needed.      hydrOXYzine (ATARAX) 50 MG tablet Take 100 mg by mouth nightly.      LIDOcaine (LIDODERM) 5 % Place 1 patch onto the skin every 24 hours. Only prn      meloxicam (MOBIC) 7.5 MG tablet Take 7.5 mg by mouth once daily.      methocarbamoL (ROBAXIN) 750 MG Tab Take 1 tablet (750 mg total) by mouth 3 (three) times daily as needed (muscle spasms). (Patient taking differently: Take 500 mg by mouth 3 (three) times daily as needed (muscle spasms).) 50 tablet 3    midodrine (PROAMATINE) 5 MG Tab Take 5 mg by mouth Daily.      mirtazapine (REMERON) 45 MG tablet Take 45 mg by mouth every evening.      mometasone 0.1% (ELOCON) 0.1 % cream       naloxone (NARCAN) 4 mg/actuation Spry USE 1 SPRAY IN ONE NOSTRIL ONCE AS DIRECTED FOR OPIATE REVERSAL  FOR OPIOID OVERDOSE. DO NOT PRIME NASAL SPRAY. GIVE ADDITIONAL DOSE IF  PATIENT DOES NOT RESPOND WITHIN 2-3 MINUTES OR RESPONDS BUT STOPS  BREATHING AGAIN.  CALL 911.  IF USED, NOTIFY YOUR PROVIDER FOR OPIATE REVERSAL  FOR OPIOID OVERDOSE. DO NOT PRIME NASAL SPRAY. GIVE ADDITIONAL DOSE IF  PATIENT DOES NOT RESPOND WITHIN 2-3 MINUTES OR RESPONDS BUT STOPS  BREATHING AGAIN.  CALL 911.  IF USED, NOTIFY YOUR PROVIDER      nystatin (MYCOSTATIN) 100,000 unit/mL suspension TAKE 5 ML BY MOUTH FOUR TIMES A DAY (SWISH AND SWALLOW)      pantoprazole (PROTONIX) 40 MG tablet Take 40 mg by mouth once daily.      pravastatin (PRAVACHOL) 80 MG tablet 40 mg.      prazosin (MINIPRESS) 2 MG Cap Take 4 mg by mouth every evening.      primidone (MYSOLINE) 50 MG Tab Take 50 mg by mouth 2 (two) times a day.      promethazine-dextromethorphan (PROMETHAZINE-DM) 6.25-15 mg/5 mL Syrp       ramelteon (ROZEREM) 8 mg tablet Take 8 mg by mouth every evening. prn      senna (SENOKOT) 8.6 mg tablet Take 17.2 mg by mouth once daily. prn      tamsulosin (FLOMAX) 0.4 mg Cap  0.4 mg once daily.      testosterone cypionate (DEPOTESTOTERONE CYPIONATE) 200 mg/mL injection 200 mg every 14 (fourteen) days.      testosterone enanthate (DELATESTRYL) 200 mg/mL injection Inject 200 mg into the muscle every 14 (fourteen) days.      topiramate (TOPAMAX) 200 MG Tab Take 200 mg by mouth.      traZODone (DESYREL) 100 MG tablet Take 100 mg by mouth every evening.      venlafaxine (EFFEXOR) 75 MG tablet Take 75 mg by mouth once daily.      verapamiL (VERELAN) 180 MG C24P Take 180 mg by mouth nightly.      XIFAXAN 550 mg Tab Take 550 mg by mouth 3 (three) times daily.       No current facility-administered medications for this visit.       Review of patient's allergies indicates:  No Known Allergies

## 2024-04-08 NOTE — LETTER
April 8, 2024       [facility]  [address], [address]  [facility] [address]  Phone: [phone]  Fax: [phone]       Patient: Marlon Franco   YOB: 1975  Date of Visit: 04/08/2024    To Whom It May Concern:    Nena Franco  was at [facility] on 04/08/2024.  If you have any questions or concerns, or if I can be of further assistance, please do not hesitate to contact me.    Sincerely,    Anastacia Valdivia NP/mt

## 2024-04-09 ENCOUNTER — ANESTHESIA EVENT (OUTPATIENT)
Dept: SURGERY | Facility: HOSPITAL | Age: 49
End: 2024-04-09
Payer: OTHER GOVERNMENT

## 2024-04-16 RX ORDER — SODIUM CHLORIDE, SODIUM GLUCONATE, SODIUM ACETATE, POTASSIUM CHLORIDE AND MAGNESIUM CHLORIDE 30; 37; 368; 526; 502 MG/100ML; MG/100ML; MG/100ML; MG/100ML; MG/100ML
INJECTION, SOLUTION INTRAVENOUS CONTINUOUS
Status: CANCELLED | OUTPATIENT
Start: 2024-04-16 | End: 2024-05-16

## 2024-04-16 RX ORDER — SODIUM CHLORIDE, SODIUM LACTATE, POTASSIUM CHLORIDE, CALCIUM CHLORIDE 600; 310; 30; 20 MG/100ML; MG/100ML; MG/100ML; MG/100ML
INJECTION, SOLUTION INTRAVENOUS CONTINUOUS
Status: CANCELLED | OUTPATIENT
Start: 2024-04-16

## 2024-04-16 RX ORDER — SODIUM CHLORIDE 9 MG/ML
INJECTION, SOLUTION INTRAVENOUS CONTINUOUS
Status: CANCELLED | OUTPATIENT
Start: 2024-04-16

## 2024-04-16 RX ORDER — ONDANSETRON HYDROCHLORIDE 2 MG/ML
4 INJECTION, SOLUTION INTRAVENOUS DAILY PRN
Status: CANCELLED | OUTPATIENT
Start: 2024-04-16

## 2024-04-17 ENCOUNTER — ANESTHESIA (OUTPATIENT)
Dept: SURGERY | Facility: HOSPITAL | Age: 49
End: 2024-04-17
Payer: OTHER GOVERNMENT

## 2024-04-17 ENCOUNTER — HOSPITAL ENCOUNTER (OUTPATIENT)
Facility: HOSPITAL | Age: 49
Discharge: HOME | End: 2024-04-17
Attending: ANESTHESIOLOGY | Admitting: ANESTHESIOLOGY
Payer: OTHER GOVERNMENT

## 2024-04-17 VITALS
HEIGHT: 72 IN | WEIGHT: 234.38 LBS | TEMPERATURE: 97 F | OXYGEN SATURATION: 100 % | SYSTOLIC BLOOD PRESSURE: 150 MMHG | RESPIRATION RATE: 18 BRPM | BODY MASS INDEX: 31.74 KG/M2 | DIASTOLIC BLOOD PRESSURE: 90 MMHG | HEART RATE: 68 BPM

## 2024-04-17 DIAGNOSIS — M46.1 SACROILIITIS: ICD-10-CM

## 2024-04-17 PROCEDURE — 63600175 PHARM REV CODE 636 W HCPCS: Mod: JZ,JG | Performed by: ANESTHESIOLOGY

## 2024-04-17 PROCEDURE — 27096 INJECT SACROILIAC JOINT: CPT | Mod: 50 | Performed by: ANESTHESIOLOGY

## 2024-04-17 PROCEDURE — 37000008 HC ANESTHESIA 1ST 15 MINUTES: Performed by: ANESTHESIOLOGY

## 2024-04-17 PROCEDURE — 25000003 PHARM REV CODE 250

## 2024-04-17 PROCEDURE — D9220A PRA ANESTHESIA: Mod: ANES,,, | Performed by: ANESTHESIOLOGY

## 2024-04-17 PROCEDURE — 63600175 PHARM REV CODE 636 W HCPCS

## 2024-04-17 PROCEDURE — D9220A PRA ANESTHESIA: Mod: CRNA,,,

## 2024-04-17 PROCEDURE — 27096 INJECT SACROILIAC JOINT: CPT | Mod: 50,,, | Performed by: ANESTHESIOLOGY

## 2024-04-17 PROCEDURE — 25000003 PHARM REV CODE 250: Performed by: ANESTHESIOLOGY

## 2024-04-17 RX ORDER — BUPIVACAINE HYDROCHLORIDE 2.5 MG/ML
INJECTION, SOLUTION EPIDURAL; INFILTRATION; INTRACAUDAL
Status: DISCONTINUED | OUTPATIENT
Start: 2024-04-17 | End: 2024-04-17 | Stop reason: HOSPADM

## 2024-04-17 RX ORDER — HYDRALAZINE HYDROCHLORIDE 20 MG/ML
INJECTION INTRAMUSCULAR; INTRAVENOUS
Status: DISCONTINUED | OUTPATIENT
Start: 2024-04-17 | End: 2024-04-17

## 2024-04-17 RX ORDER — BUPIVACAINE HYDROCHLORIDE 2.5 MG/ML
INJECTION, SOLUTION EPIDURAL; INFILTRATION; INTRACAUDAL
Status: DISCONTINUED
Start: 2024-04-17 | End: 2024-04-17 | Stop reason: HOSPADM

## 2024-04-17 RX ORDER — TRIAMCINOLONE ACETONIDE 40 MG/ML
INJECTION, SUSPENSION INTRA-ARTICULAR; INTRAMUSCULAR
Status: DISCONTINUED
Start: 2024-04-17 | End: 2024-04-17 | Stop reason: HOSPADM

## 2024-04-17 RX ORDER — PROPOFOL 10 MG/ML
VIAL (ML) INTRAVENOUS
Status: DISCONTINUED | OUTPATIENT
Start: 2024-04-17 | End: 2024-04-17

## 2024-04-17 RX ORDER — LIDOCAINE HYDROCHLORIDE 10 MG/ML
INJECTION INFILTRATION; PERINEURAL
Status: DISCONTINUED
Start: 2024-04-17 | End: 2024-04-17 | Stop reason: HOSPADM

## 2024-04-17 RX ORDER — LIDOCAINE HYDROCHLORIDE 10 MG/ML
INJECTION, SOLUTION EPIDURAL; INFILTRATION; INTRACAUDAL; PERINEURAL
Status: DISCONTINUED | OUTPATIENT
Start: 2024-04-17 | End: 2024-04-17 | Stop reason: HOSPADM

## 2024-04-17 RX ORDER — LIDOCAINE HYDROCHLORIDE 20 MG/ML
INJECTION INTRAVENOUS
Status: DISCONTINUED | OUTPATIENT
Start: 2024-04-17 | End: 2024-04-17

## 2024-04-17 RX ORDER — TRIAMCINOLONE ACETONIDE 40 MG/ML
INJECTION, SUSPENSION INTRA-ARTICULAR; INTRAMUSCULAR
Status: DISCONTINUED | OUTPATIENT
Start: 2024-04-17 | End: 2024-04-17 | Stop reason: HOSPADM

## 2024-04-17 RX ADMIN — PROPOFOL 100 MG: 10 INJECTION, EMULSION INTRAVENOUS at 01:04

## 2024-04-17 RX ADMIN — PROPOFOL 20 MG: 10 INJECTION, EMULSION INTRAVENOUS at 01:04

## 2024-04-17 RX ADMIN — SODIUM CHLORIDE: 9 INJECTION, SOLUTION INTRAVENOUS at 01:04

## 2024-04-17 RX ADMIN — HYDRALAZINE HYDROCHLORIDE 5 MG: 20 INJECTION INTRAMUSCULAR; INTRAVENOUS at 01:04

## 2024-04-17 RX ADMIN — LIDOCAINE HYDROCHLORIDE 50 MG: 20 INJECTION INTRAVENOUS at 01:04

## 2024-04-17 NOTE — ANESTHESIA POSTPROCEDURE EVALUATION
Anesthesia Post Evaluation    Patient: Marlon Franco    Procedure(s) Performed: Procedure(s) (LRB):  INJECTION,SACROILIAC JOINT (Bilateral)    Final Anesthesia Type: general      Patient location during evaluation: PACU  Patient participation: Yes- Able to Participate  Level of consciousness: awake  Post-procedure vital signs: reviewed and stable  Pain management: adequate  Airway patency: patent    PONV status at discharge: vomiting (controlled) and nausea (controlled)  Anesthetic complications: no      Cardiovascular status: hemodynamically stable  Respiratory status: spontaneous ventilation and unassisted  Hydration status: euvolemic  Follow-up not needed.  Comments:                  Vitals Value Taken Time   /119 04/17/24 1334   Temp 97.2 04/17/24 1347   Pulse 94 04/17/24 1334   Resp 16 04/17/24 1334   SpO2 95 % 04/17/24 1334         No case tracking events are documented in the log.      Pain/Amos Score: Amos Score: 9 (4/17/2024  1:39 PM)  Modified Amos Score: 17 (4/17/2024  1:39 PM)

## 2024-04-17 NOTE — DISCHARGE SUMMARY
[facility] - Periop Services  Discharge Note  Short Stay    Procedure(s) (LRB):  INJECTION,SACROILIAC JOINT (Bilateral)      OUTCOME: Patient tolerated treatment/procedure well without complication and is now ready for discharge.    DISPOSITION: Home or Self Care    FINAL DIAGNOSIS:  <principal problem not specified>    FOLLOWUP: In clinic    DISCHARGE INSTRUCTIONS:  No discharge procedures on file.     TIME SPENT ON DISCHARGE: 5 minutes

## 2024-04-17 NOTE — OP NOTE
Bilateral Sacroiliac Joint Injections    Pre-Procedure Diagnoses:  1. Chronic pain syndrome  2. Bilateral SI joint pain  3. Bilateral sacroiliitis  4. Low back pain    Post-Procedure Diagnoses:  1. Chronic pain syndrome  2. Bilateral SI joint pain  3. Bilateral sacroiliitis  4. Low back pain    Anesthesia:  Local and MAC    Estimated Blood Loss:  None    Complications:  None    Informed Consent:  The procedure, risks, benefits, and alternatives were discussed with the patient. There were no contraindications to the procedure. The patient expressed understanding and agreed to proceed. Fully informed written consent was obtained.     Description of the Procedure:  The patient was taken to the operating room. IV access was obtained prior to the start of the procedure. The patient was positioned prone on the fluoroscopy table. Continuous hemodynamic monitoring was initiated and continued throughout the duration of the procedure. The skin overlying the lumbosacral spine was prepped with Chloraprep and draped into a sterile field. Fluoroscopy was used to identify the location of the left SI joint. Skin anesthesia was achieved using 1 mL of 1% lidocaine over the injection site. A 22 gauge 3.5 inch Quinke spinal needle was slowly inserted and advanced under intermittent fluoroscopy through the SI joint capsule. Proper needle position was confirmed under AP, oblique, and lateral fluoroscopic views. Negative aspiration was confirmed. Then a combination of 20 mg of Kenalog and 1 mL of 0.25% bupivacaine was easily injected. There was no pain on injection. The needle was removed intact and bleeding was nil. The same procedure was repeated in identical fashion on the right side. Sterile bandages were applied. The patient was taken to the recovery room for further observation in stable condition. The patient was then discharged home without any complications.

## 2024-04-17 NOTE — ANESTHESIA PREPROCEDURE EVALUATION
"                                                                                                             04/17/2024  Marlon Franco is a 48 y.o., male.  Pre-operative evaluation for Procedure(s) (LRB):  INJECTION,SACROILIAC JOINT (Bilateral)    Ht 6' 0.01" (1.829 m)   Wt 107.5 kg (237 lb)   BMI 32.14 kg/m²     Past Medical History:   Diagnosis Date    Carpal tunnel syndrome of left wrist     Carpal tunnel syndrome of right wrist     Essential tremor     GERD (gastroesophageal reflux disease)     HTN (hypertension)     Low back pain     Lumbar radiculitis     Lumbar spinal stenosis     Piriformis syndrome     PTSD (post-traumatic stress disorder)     Sacroiliitis     Sleep apnea, unspecified     uses cpap       Patient Active Problem List   Diagnosis    Chronic back pain greater than 3 months duration    Sacroiliitis    Lumbar degenerative disc disease    Cervicalgia    Cervical radiculitis    DDD (degenerative disc disease), cervical    Avulsion fracture of metatarsal bone of left foot with routine healing       Review of patient's allergies indicates:  No Known Allergies    Current Outpatient Medications   Medication Instructions    allopurinoL (ZYLOPRIM) 100 mg, Oral, Daily    alprostadiL (EDEX) 20 mcg injection INJECT 20MCG/1VIL INTRACAVITY EVERY DAY AS NEEDED  FOR THE TREATMENT OF ERECTILE DYSFUNCTION *MAX OF 6 PER 90 DAYS SUPPLY*    anastrozole (ARIMIDEX) 1 mg Tab 1 tablet, Oral, Weekly    busPIRone (BUSPAR) 15 mg, Oral, 2 times daily    clonazePAM (KLONOPIN) 1 mg, Oral, 3 times daily PRN    cyanocobalamin (VITAMIN B-12) 1000 MCG tablet 1 tablet, Oral, Daily    dicyclomine (BENTYL) 20 mg, Oral, Daily    divalproex (DEPAKOTE) 500 mg, Oral, Nightly    flecainide (TAMBOCOR) 50 mg, Oral, 2 times daily    fluticasone propionate (FLONASE) 50 mcg/actuation nasal spray 1 spray, Each Nostril, As needed (PRN)    gabapentin (NEURONTIN) 400 mg, Oral, 3 times daily    HUMIRA(CF) 40 mg, Subcutaneous, Every 14 days "    hydrALAZINE (APRESOLINE) 25 MG tablet 1 tablet, Oral, Daily    HYDROcodone-acetaminophen (NORCO)  mg per tablet 1 tablet, Oral, Every 6 hours PRN    HYDROcodone-acetaminophen  mg/15 mL Soln 1 tablet, Oral, Every 6 hours PRN    hydrOXYzine (ATARAX) 100 mg, Oral, Nightly    LIDOcaine (LIDODERM) 5 % 1 patch, Transdermal, Every 24 hours (non-standard times), Only prn    meloxicam (MOBIC) 7.5 mg, Oral, Daily    methocarbamoL (ROBAXIN) 750 mg, Oral, 3 times daily PRN    midodrine (PROAMATINE) 5 mg, Oral, Daily    mirtazapine (REMERON) 45 mg, Oral, Nightly    mometasone 0.1% (ELOCON) 0.1 % cream     naloxone (NARCAN) 4 mg/actuation Spry USE 1 SPRAY IN ONE NOSTRIL ONCE AS DIRECTED FOR OPIATE REVERSAL  FOR OPIOID OVERDOSE. DO NOT PRIME NASAL SPRAY. GIVE ADDITIONAL DOSE IF  PATIENT DOES NOT RESPOND WITHIN 2-3 MINUTES OR RESPONDS BUT STOPS  BREATHING AGAIN.  CALL 911.  IF USED, NOTIFY YOUR PROVIDER FOR OPIATE REVERSAL  FOR OPIOID OVERDOSE. DO NOT PRIME NASAL SPRAY. GIVE ADDITIONAL DOSE IF  PATIENT DOES NOT RESPOND WITHIN 2-3 MINUTES OR RESPONDS BUT STOPS  BREATHING AGAIN.  CALL 911.  IF USED, NOTIFY YOUR PROVIDER    nystatin (MYCOSTATIN) 100,000 unit/mL suspension TAKE 5 ML BY MOUTH FOUR TIMES A DAY (SWISH AND SWALLOW)    pantoprazole (PROTONIX) 40 mg, Oral, Daily    pravastatin (PRAVACHOL) 80 mg, Oral, Nightly    prazosin (MINIPRESS) 4 mg, Oral, Nightly    primidone (MYSOLINE) 50 mg, Oral, 2 times daily    promethazine-dextromethorphan (PROMETHAZINE-DM) 6.25-15 mg/5 mL Syrp     ramelteon (ROZEREM) 8 mg, Oral, Nightly, prn    senna (SENOKOT) 17.2 mg, Oral, Daily, prn    tamsulosin (FLOMAX) 0.4 mg, Daily    testosterone cypionate (DEPOTESTOTERONE CYPIONATE) 200 mg, Every 14 days    testosterone enanthate (DELATESTRYL) 200 mg, Intramuscular, Every 14 days    topiramate (TOPAMAX) 200 mg, Oral    traZODone (DESYREL) 100 mg, Oral, Nightly    venlafaxine (EFFEXOR) 75 mg, Oral, Daily    verapamiL (VERELAN) 180 mg,  "Oral, Nightly    XIFAXAN 550 mg, Oral, 3 times daily       Past Surgical History:   Procedure Laterality Date    ANKLE SURGERY Left     BUNIONECTOMY Left     EPIDURAL STEROID INJECTION INTO CERVICAL SPINE N/A 10/12/2022    Procedure: Injection-steroid-epidural-cervical;  Surgeon: Beth Brooks MD;  Location: [facility] OR;  Service: Pain Management;  Laterality: N/A;  C7-T1    EPIDURAL STEROID INJECTION INTO CERVICAL SPINE N/A 3/21/2024    Procedure: INJECTION, STEROID, SPINE, CERVICAL, EPIDURAL C7-T1;  Surgeon: Beth rBooks MD;  Location: [facility] OR;  Service: Pain Management;  Laterality: N/A;    FRACTURE SURGERY  1981    Broke left arm    INJECTION OF PIRIFORMIS MUSCLE Left 02/16/2022    INJECTION, SACROILIAC JOINT Left 02/16/2022    INJECTION, SACROILIAC JOINT Right 11/16/2022    Procedure: INJECTION,SACROILIAC JOINT;  Surgeon: Beth Brooks MD;  Location: [facility] OR;  Service: Pain Management;  Laterality: Right;  Right SIJ    INJECTION, SACROILIAC JOINT Left 07/12/2023    Procedure: INJECTION,SACROILIAC JOINT;  Surgeon: Beth Brooks MD;  Location: [facility] OR;  Service: Pain Management;  Laterality: Left;  Left SI Joint Injection    INJECTION, SACROILIAC JOINT Bilateral 11/22/2023    Procedure: INJECTION,SACROILIAC JOINT;  Surgeon: Beth Brooks MD;  Location: [facility] OR;  Service: Pain Management;  Laterality: Bilateral;  Bilateral SI Joint Injection    ORIF LEFT ARM      REPAIR OF SCROTUM      SURGICAL REMOVAL OF PILONIDAL CYST      TRANSFORAMINAL EPIDURAL INJECTION OF STEROID Left 02/24/2021    L3 & L4    TRIGGER POINT INJECTION  10/04/2021         No results found for: "WBC", "HGB", "HCT", "MCV", "PLT"       BMP  No results found for: "NA", "K", "CHLORIDE", "CO2", "GLUCOSE", "BUN", "CREATININE", "CALCIUM", "ANIONGAP", "ESTGFRAFRICA", "EGFRNONAA"     INR  No results for input(s): "PT", "INR", "PROTIME", "APTT" in the last 72 hours.        Diagnostic Studies:      EKG:  No results found for this or any " previous visit.    No results found for this or any previous visit.            Pre-op Assessment    I have reviewed the Patient Summary Reports.    I have reviewed the NPO Status.   I have reviewed the Medications.     Review of Systems  Anesthesia Hx:  No problems with previous Anesthesia             Denies Family Hx of Anesthesia complications.    Denies Personal Hx of Anesthesia complications.                    Cardiovascular:  Cardiovascular Normal                   No Cardiac Complaints                         Pulmonary:  Pulmonary Normal        No Pulmonary Complaints               Hepatic/GI:        No Current GERD Sx              Physical Exam  General: Alert and Oriented    Airway:  Mallampati: II   Mouth Opening: Normal  TM Distance: Normal  Tongue: Normal  Neck ROM: Normal ROM    Dental:  Intact, Caps / Implants    Chest/Lungs:  Clear to auscultation, Normal Respiratory Rate    Heart:  Rate: Normal  Rhythm: Regular Rhythm        Anesthesia Plan  Type of Anesthesia, risks & benefits discussed:    Anesthesia Type: Gen Natural Airway  Intra-op Monitoring Plan: Standard ASA Monitors  Post Op Pain Control Plan: multimodal analgesia  Induction:  IV  Airway Plan: Direct  Informed Consent: Informed consent signed with the Patient and all parties understand the risks and agree with anesthesia plan.  All questions answered.   ASA Score: 2  Day of Surgery Review of History & Physical: H&P Update referred to the surgeon/provider.  Anesthesia Plan Notes: Nasal cannula vs facemask supplemental oxygenation   For patients with JE/obesity, may consider SuperNoval Nasal CPAP      Poss conversion to General LMA/AVE discussed          Ready For Surgery From Anesthesia Perspective.     .

## 2024-04-17 NOTE — TRANSFER OF CARE
Anesthesia Transfer of Care Note    Patient: Marlon Franco    Procedure(s) Performed: Procedure(s) (LRB):  INJECTION,SACROILIAC JOINT (Bilateral)    Patient location: OPS    Anesthesia Type: MAC    Transport from OR: Transported from OR on room air with adequate spontaneous ventilation    Post pain: adequate analgesia    Post assessment: no apparent anesthetic complications    Post vital signs: stable    Level of consciousness: awake, alert and oriented    Nausea/Vomiting: no nausea/vomiting    Complications: none    Transfer of care protocol was followed      Last vitals: Visit Vitals  BP (!) 197/119 (BP Location: Left arm, Patient Position: Lying)   Pulse 94   Temp 36.5 °C (97.7 °F) (Tympanic)   Resp 16   Ht 6' (1.829 m)   Wt 106.3 kg (234 lb 5.6 oz)   SpO2 95%   BMI 31.78 kg/m²

## 2024-09-04 ENCOUNTER — OFFICE VISIT (OUTPATIENT)
Facility: CLINIC | Age: 49
End: 2024-09-04
Payer: OTHER GOVERNMENT

## 2024-09-04 VITALS
TEMPERATURE: 98 F | HEART RATE: 83 BPM | DIASTOLIC BLOOD PRESSURE: 95 MMHG | BODY MASS INDEX: 31.74 KG/M2 | SYSTOLIC BLOOD PRESSURE: 133 MMHG | HEIGHT: 72 IN | WEIGHT: 234.38 LBS

## 2024-09-04 DIAGNOSIS — M51.36 LUMBAR DEGENERATIVE DISC DISEASE: Primary | ICD-10-CM

## 2024-09-04 DIAGNOSIS — M50.30 DDD (DEGENERATIVE DISC DISEASE), CERVICAL: ICD-10-CM

## 2024-09-04 DIAGNOSIS — M54.12 CERVICAL RADICULITIS: ICD-10-CM

## 2024-09-04 DIAGNOSIS — M54.9 CHRONIC BACK PAIN GREATER THAN 3 MONTHS DURATION: ICD-10-CM

## 2024-09-04 DIAGNOSIS — G89.29 CHRONIC BACK PAIN GREATER THAN 3 MONTHS DURATION: ICD-10-CM

## 2024-09-04 PROCEDURE — 99213 OFFICE O/P EST LOW 20 MIN: CPT | Mod: ,,, | Performed by: ANESTHESIOLOGY

## 2024-09-04 NOTE — LETTER
September 4, 2024      [facility]  [address] [address]  Phone: [phone]  Fax: [phone]       Patient: Marlon Franco   YOB: 1975  Date of Visit: 09/04/2024    To Whom It May Concern:    Nena Franco  was at [facility] on 09/04/2024.  If you have any questions or concerns, or if I can be of further assistance, please do not hesitate to contact me.    Sincerely,    Beth Brooks MD/mt

## 2024-09-04 NOTE — PROGRESS NOTES
Beth Brooks MD        PATIENT NAME: Marlon Franco  : 1975  DATE: 24  MRN: [id number]      Billing Provider: Beth Brooks MD  Level of Service:   Patient PCP Information       Provider PCP Type    Primary Doctor No General            Reason for Visit / Chief Complaint: Low-back Pain (Post-op bilateral SI Joint Injection 24, pt states he received great relief and it has lasted to date, Advil and RX medication for relief, pain level 3/10//Wants to discuss future neck injection)       Update PCP  Update Chief Complaint         History of Present Illness / Problem Focused Workflow     Marlon Franco presents to the clinic with Low-back Pain (Post-op bilateral SI Joint Injection 24, pt states he received great relief and it has lasted to date, Advil and RX medication for relief, pain level 3/10//Wants to discuss future neck injection)       This is a 49-year-old male who presents to clinic today for follow up of his chronic neck pain and lower back pain.  He underwent bilateral sacroiliac joint injections in April.  He is still getting some relief from that.  Then, he been diagnosed with diverticulitis.  He is also seeing a new rheumatologist in trying to get back on Humira.  [facility].  He is primarily complaining of neck pain today radiating into the right upper extremity and also associated with numbness and tingling in his hands.  He has a lot of appointments and procedures coming up, so he is unable to schedule an injection at this time.      Back Pain  Associated symptoms include numbness.   Neck Pain   Associated symptoms include numbness.   Low-back Pain  Associated symptoms include numbness.       Review of Systems     Review of Systems   Musculoskeletal:  Positive for back pain, neck pain and neck stiffness.   Neurological:  Positive for dizziness and numbness.   All other systems reviewed and are negative.       Medical / Social /  Family History     Past Medical History:   Diagnosis Date    Carpal tunnel syndrome of left wrist     Carpal tunnel syndrome of right wrist     Essential tremor     GERD (gastroesophageal reflux disease)     HTN (hypertension)     Low back pain     Lumbar radiculitis     Lumbar spinal stenosis     Piriformis syndrome     PTSD (post-traumatic stress disorder)     Sacroiliitis     Sleep apnea, unspecified     uses cpap       Past Surgical History:   Procedure Laterality Date    ANKLE SURGERY Left     BUNIONECTOMY Left     EPIDURAL STEROID INJECTION INTO CERVICAL SPINE N/A 10/12/2022    Procedure: Injection-steroid-epidural-cervical;  Surgeon: Beth Brooks MD;  Location: [facility] OR;  Service: Pain Management;  Laterality: N/A;  C7-T1    EPIDURAL STEROID INJECTION INTO CERVICAL SPINE N/A 3/21/2024    Procedure: INJECTION, STEROID, SPINE, CERVICAL, EPIDURAL C7-T1;  Surgeon: Beth Brooks MD;  Location: [facility] OR;  Service: Pain Management;  Laterality: N/A;    FRACTURE SURGERY  1981    Broke left arm    INJECTION OF PIRIFORMIS MUSCLE Left 02/16/2022    INJECTION, SACROILIAC JOINT Left 02/16/2022    INJECTION, SACROILIAC JOINT Right 11/16/2022    Procedure: INJECTION,SACROILIAC JOINT;  Surgeon: Beth Brooks MD;  Location: [facility] OR;  Service: Pain Management;  Laterality: Right;  Right SIJ    INJECTION, SACROILIAC JOINT Left 07/12/2023    Procedure: INJECTION,SACROILIAC JOINT;  Surgeon: Beth Brooks MD;  Location: [facility] OR;  Service: Pain Management;  Laterality: Left;  Left SI Joint Injection    INJECTION, SACROILIAC JOINT Bilateral 11/22/2023    Procedure: INJECTION,SACROILIAC JOINT;  Surgeon: Beth Brooks MD;  Location: [facility] OR;  Service: Pain Management;  Laterality: Bilateral;  Bilateral SI Joint Injection    INJECTION, SACROILIAC JOINT Bilateral 4/17/2024    Procedure: INJECTION,SACROILIAC JOINT;  Surgeon: Beth Brooks MD;  Location: [facility] OR;  Service: Pain Management;  Laterality: Bilateral;   Bilateral SI Joint Injection    ORIF LEFT ARM      REPAIR OF SCROTUM      SURGICAL REMOVAL OF PILONIDAL CYST      TRANSFORAMINAL EPIDURAL INJECTION OF STEROID Left 02/24/2021    L3 & L4    TRIGGER POINT INJECTION  10/04/2021       Social History  Mr. Franco  reports that he has never smoked. He has never used smokeless tobacco. He reports that he does not currently use alcohol. He reports that he does not use drugs.    Family History  Mr.'s Franco family history includes Arthritis in his maternal aunt; Asthma in his father; Cancer in his mother; Heart disease in his father.    Medications and Allergies     Medications  Outpatient Medications Marked as Taking for the 9/4/24 encounter (Office Visit) with Beth Brooks MD   Medication Sig Dispense Refill    allopurinoL (ZYLOPRIM) 100 MG tablet Take 100 mg by mouth once daily.      alprostadiL (EDEX) 20 mcg injection INJECT 20MCG/1VIL INTRACAVITY EVERY DAY AS NEEDED  FOR THE TREATMENT OF ERECTILE DYSFUNCTION *MAX OF 6 PER 90 DAYS SUPPLY*      busPIRone (BUSPAR) 15 MG tablet Take 15 mg by mouth 2 (two) times daily.      clonazePAM (KLONOPIN) 1 MG tablet Take 1 mg by mouth 3 (three) times daily as needed.      cyanocobalamin (VITAMIN B-12) 1000 MCG tablet Take 1 tablet by mouth once daily.      dicyclomine (BENTYL) 20 mg tablet Take 20 mg by mouth once daily.      divalproex (DEPAKOTE) 500 MG TbEC Take 500 mg by mouth nightly.      flecainide (TAMBOCOR) 50 MG Tab Take 50 mg by mouth 2 (two) times daily.      fluticasone propionate (FLONASE) 50 mcg/actuation nasal spray 1 spray by Each Nostril route as needed.      gabapentin (NEURONTIN) 400 MG capsule Take 400 mg by mouth 3 (three) times daily.      hydrALAZINE (APRESOLINE) 25 MG tablet Take 1 tablet by mouth once daily.      HYDROcodone-acetaminophen (NORCO)  mg per tablet Take 1 tablet by mouth every 6 (six) hours as needed.      hydrOXYzine (ATARAX) 50 MG tablet Take 100 mg by mouth nightly.      LIDOcaine  (LIDODERM) 5 % Place 1 patch onto the skin every 24 hours. Only prn      meloxicam (MOBIC) 7.5 MG tablet Take 7.5 mg by mouth once daily.      methocarbamoL (ROBAXIN) 750 MG Tab Take 1 tablet (750 mg total) by mouth 3 (three) times daily as needed (muscle spasms). 50 tablet 3    midodrine (PROAMATINE) 5 MG Tab Take 5 mg by mouth Daily.      mirtazapine (REMERON) 45 MG tablet Take 45 mg by mouth every evening.      mometasone 0.1% (ELOCON) 0.1 % cream       naloxone (NARCAN) 4 mg/actuation Spry USE 1 SPRAY IN ONE NOSTRIL ONCE AS DIRECTED FOR OPIATE REVERSAL  FOR OPIOID OVERDOSE. DO NOT PRIME NASAL SPRAY. GIVE ADDITIONAL DOSE IF  PATIENT DOES NOT RESPOND WITHIN 2-3 MINUTES OR RESPONDS BUT STOPS  BREATHING AGAIN.  CALL 911.  IF USED, NOTIFY YOUR PROVIDER FOR OPIATE REVERSAL  FOR OPIOID OVERDOSE. DO NOT PRIME NASAL SPRAY. GIVE ADDITIONAL DOSE IF  PATIENT DOES NOT RESPOND WITHIN 2-3 MINUTES OR RESPONDS BUT STOPS  BREATHING AGAIN.  CALL 911.  IF USED, NOTIFY YOUR PROVIDER      nystatin (MYCOSTATIN) 100,000 unit/mL suspension TAKE 5 ML BY MOUTH FOUR TIMES A DAY (SWISH AND SWALLOW)      pantoprazole (PROTONIX) 40 MG tablet Take 40 mg by mouth once daily.      pravastatin (PRAVACHOL) 80 MG tablet Take 80 mg by mouth every evening.      prazosin (MINIPRESS) 2 MG Cap Take 4 mg by mouth every evening.      primidone (MYSOLINE) 50 MG Tab Take 50 mg by mouth 2 (two) times a day.      tamsulosin (FLOMAX) 0.4 mg Cap 0.4 mg once daily.      testosterone cypionate (DEPOTESTOTERONE CYPIONATE) 200 mg/mL injection 200 mg every 14 (fourteen) days.      testosterone enanthate (DELATESTRYL) 200 mg/mL injection Inject 200 mg into the muscle every 14 (fourteen) days.      topiramate (TOPAMAX) 200 MG Tab Take 200 mg by mouth 2 (two) times daily.      traZODone (DESYREL) 100 MG tablet Take 100 mg by mouth every evening.      venlafaxine (EFFEXOR) 75 MG tablet Take 75 mg by mouth once daily.      verapamiL (VERELAN) 180 MG C24P Take 180 mg by  mouth nightly.      XIFAXAN 550 mg Tab Take 550 mg by mouth 3 (three) times daily.         Allergies  Review of patient's allergies indicates:  No Known Allergies    Physical Examination     Vitals:    09/04/24 1310   BP: (!) 133/95   Pulse: 83   Temp: 98.3 °F (36.8 °C)     Spine Musculoskeletal Exam    Gait    Gait is normal.    Inspection    Cervical Spine    Cervical spine inspection is normal.    Palpation    Thoracolumbar    Tenderness: present      Piriformis: right      SI Joint: right    Range of Motion    Cervical Spine        Cervical spine range of motion additional comments: Restricted ROM in neck due to pain.    Strength    Cervical Spine    Cervical spine motor exam is normal.    Sensory    Cervical Spine    Cervical spine sensation is normal.    Special Tests    Thoracolumbar      Right      DENA test: positive    General      Constitutional: appears stated age, well-developed and well-nourished    Scleral icterus: no    Labored breathing: no    Psychiatric: normal mood and affect and no acute distress    Neurological: alert and oriented x3    Skin: intact    Lymphadenopathy: none     CV: extremities warm, well-perfused  Resp: nonlabored breathing    Assessment and Plan (including Health Maintenance)      Problem List  Smart Sets  Document Outside HM   :    Plan:   Lumbar degenerative disc disease    Cervical radiculitis    DDD (degenerative disc disease), cervical    Chronic back pain greater than 3 months duration       He would like to schedule a cervical epidural steroid injection but is not able to do so right now because he has a lot of medical appointments and procedures coming up.  He will call back when he is ready to get this set up.    Problem List Items Addressed This Visit          Neuro    Lumbar degenerative disc disease - Primary    Cervical radiculitis    DDD (degenerative disc disease), cervical       Orthopedic    Chronic back pain greater than 3 months duration         No future  appointments.             There are no Patient Instructions on file for this visit.  Follow up if symptoms worsen or fail to improve.     Signature:  Beth Brooks MD      Date of encounter: 9/4/24

## (undated) DEVICE — NDL SPINAL 22GA 3.5 IN QUINCKE

## (undated) DEVICE — Device

## (undated) DEVICE — CANNULA AIRLIFE ETCO2 NSL 7FT

## (undated) DEVICE — NDL SAFETY 25G X 1.5 ECLIPSE

## (undated) DEVICE — BANDAGE SHEER STRIP 3/4X3IN

## (undated) DEVICE — SYR DISP LL 5CC

## (undated) DEVICE — CHLORAPREP 10.5 ML APPLICATOR

## (undated) DEVICE — SET SMARTSITE EXT SMALLBORE NF

## (undated) DEVICE — SYR 3CC LUER LOC

## (undated) DEVICE — KIT SURGICAL TURNOVER

## (undated) DEVICE — POSITIONER HEAD ADULT

## (undated) DEVICE — NDL FLTR 5MCRN BLNT TIP 18GX1

## (undated) DEVICE — APPLICATOR CHLORAPREP ORN 26ML

## (undated) DEVICE — DRAPE UTILITY W/ TAPE 20X30IN

## (undated) DEVICE — NDL QUINCKE S/SU 22GA 5IN

## (undated) DEVICE — CONTRAST ISOVUE M 200 20ML VIL

## (undated) DEVICE — DRAPE MEDIUM SHEET 40X70IN

## (undated) DEVICE — SYR EPILOR LUER-LOK LOR 7ML

## (undated) DEVICE — NDL EPIDURAL TOUHY 18G X3.5

## (undated) DEVICE — GLOVE PROTEXIS PI CRM 6.5

## (undated) DEVICE — NDL HYPO REG 25G X 1 1/2

## (undated) DEVICE — SYR 10CC LUER LOCK

## (undated) DEVICE — NDL SYR 10ML 18X1.5 LL BLUNT

## (undated) DEVICE — NDL BLUNT FILL 18G 1IN

## (undated) DEVICE — SYR POSIFLUSH NACL PREFIL 10ML

## (undated) DEVICE — SEE MEDLINE ITEM 146410

## (undated) DEVICE — SYR 3ML LL 18GA 1.5IN

## (undated) DEVICE — BANDAGE ADHESIVE

## (undated) DEVICE — GLOVE SIGNATURE MICRO LTX 6.5